# Patient Record
Sex: MALE | Race: BLACK OR AFRICAN AMERICAN | NOT HISPANIC OR LATINO | Employment: OTHER | URBAN - METROPOLITAN AREA
[De-identification: names, ages, dates, MRNs, and addresses within clinical notes are randomized per-mention and may not be internally consistent; named-entity substitution may affect disease eponyms.]

---

## 2018-07-19 ENCOUNTER — OFFICE VISIT (OUTPATIENT)
Dept: URGENT CARE | Facility: CLINIC | Age: 43
End: 2018-07-19
Payer: COMMERCIAL

## 2018-07-19 VITALS
DIASTOLIC BLOOD PRESSURE: 89 MMHG | OXYGEN SATURATION: 97 % | HEIGHT: 73 IN | BODY MASS INDEX: 32.2 KG/M2 | TEMPERATURE: 98 F | SYSTOLIC BLOOD PRESSURE: 122 MMHG | RESPIRATION RATE: 15 BRPM | WEIGHT: 243 LBS | HEART RATE: 78 BPM

## 2018-07-19 DIAGNOSIS — G89.29 CHRONIC RIGHT-SIDED LOW BACK PAIN WITHOUT SCIATICA: ICD-10-CM

## 2018-07-19 DIAGNOSIS — M54.50 CHRONIC RIGHT-SIDED LOW BACK PAIN WITHOUT SCIATICA: ICD-10-CM

## 2018-07-19 DIAGNOSIS — R21 RASH/SKIN ERUPTION: ICD-10-CM

## 2018-07-19 DIAGNOSIS — Z76.89 ENCOUNTER TO ESTABLISH CARE: ICD-10-CM

## 2018-07-19 DIAGNOSIS — S99.912A INJURY OF LEFT ANKLE, INITIAL ENCOUNTER: Primary | ICD-10-CM

## 2018-07-19 PROCEDURE — 99204 OFFICE O/P NEW MOD 45 MIN: CPT | Mod: S$GLB,,, | Performed by: NURSE PRACTITIONER

## 2018-07-19 PROCEDURE — 3074F SYST BP LT 130 MM HG: CPT | Mod: CPTII,S$GLB,, | Performed by: NURSE PRACTITIONER

## 2018-07-19 PROCEDURE — 3079F DIAST BP 80-89 MM HG: CPT | Mod: CPTII,S$GLB,, | Performed by: NURSE PRACTITIONER

## 2018-07-19 NOTE — PROGRESS NOTES
"Subjective:       Patient ID: Lawrence Devi is a 43 y.o. male.    Vitals:  height is 6' 1" (1.854 m) and weight is 110.2 kg (243 lb). His temperature is 97.8 °F (36.6 °C). His blood pressure is 122/89 and his pulse is 78. His respiration is 15 and oxygen saturation is 97%.     Chief Complaint: Ankle Pain (left ankle pain 1 week without known injury but is on feet alot) and Axillary Swelling (discomfort and itching intermittently 5 months from deotorant )    Pt is a  and local, reports left ankle pain for more than a week now. Says he walks a lot and is on his feet constantly for working movie sets. Also comes in for bilateral arm pit chaffing, irritation, and itching for 5 months now. Has been using cortisone and coconut oil.  Also says he has right flank pain and urinary difficulty and decreased output and struggling to urinate in the morning. Has not taken anything otc for signs or symptoms. Pt would like a work up with blood and urine to make sure all is well because he never sees a doctor and hasnt had a physical or labs done in a long time. Pt is also requesting referrals for pcp's.      Ankle Pain    The incident occurred more than 1 week ago. There was no injury mechanism. The pain is present in the right ankle. The quality of the pain is described as aching. The pain is at a severity of 6/10. The pain is moderate. The pain has been worsening since onset. Pertinent negatives include no loss of sensation, numbness or tingling. He reports no foreign bodies present.     Review of Systems   Constitution: Negative for chills, decreased appetite, malaise/fatigue and night sweats.   HENT: Negative for congestion, ear discharge, ear pain and sore throat.    Eyes: Negative for blurred vision and discharge.   Cardiovascular: Negative for chest pain.   Respiratory: Negative for cough, shortness of breath and wheezing.    Skin: Positive for dry skin, itching and rash.   Musculoskeletal: Positive for joint pain " and stiffness. Negative for back pain and joint swelling.   Gastrointestinal: Negative for bloating, abdominal pain, constipation, diarrhea, nausea and vomiting.   Genitourinary: Positive for flank pain and hesitancy. Negative for bladder incontinence, dysuria and urgency.   Neurological: Negative for dizziness, headaches, numbness and tingling.   Psychiatric/Behavioral: The patient is not nervous/anxious.        Objective:      Physical Exam   Constitutional: He is oriented to person, place, and time. Vital signs are normal. He appears well-developed and well-nourished. He is cooperative.  Non-toxic appearance. He does not have a sickly appearance. He does not appear ill. No distress.   HENT:   Head: Normocephalic and atraumatic.   Right Ear: Hearing, tympanic membrane, external ear and ear canal normal.   Left Ear: Hearing, tympanic membrane, external ear and ear canal normal.   Nose: Nose normal. No mucosal edema, rhinorrhea or nasal deformity. No epistaxis. Right sinus exhibits no maxillary sinus tenderness and no frontal sinus tenderness. Left sinus exhibits no maxillary sinus tenderness and no frontal sinus tenderness.   Mouth/Throat: Uvula is midline, oropharynx is clear and moist and mucous membranes are normal. No trismus in the jaw. Normal dentition. No uvula swelling. No posterior oropharyngeal erythema. Tonsils are 1+ on the right. Tonsils are 1+ on the left. No tonsillar exudate.   Eyes: Conjunctivae, EOM and lids are normal. Pupils are equal, round, and reactive to light. Right eye exhibits no discharge. Left eye exhibits no discharge. No scleral icterus.   Sclera clear bilat   Neck: Trachea normal, normal range of motion, full passive range of motion without pain and phonation normal. Neck supple.   Cardiovascular: Normal rate, regular rhythm, S1 normal, S2 normal, normal heart sounds, intact distal pulses and normal pulses.    Pulmonary/Chest: Effort normal and breath sounds normal. No respiratory  "distress. He has no decreased breath sounds. He has no wheezes. He has no rhonchi. He has no rales.   Abdominal: Soft. Normal appearance and bowel sounds are normal. He exhibits no distension, no pulsatile midline mass and no mass. There is no tenderness.   Musculoskeletal: He exhibits no edema or deformity.        Left ankle: He exhibits decreased range of motion. He exhibits no swelling, no ecchymosis, no deformity, no laceration and normal pulse. Tenderness. Lateral malleolus tenderness found. No medial malleolus, no AITFL, no CF ligament, no posterior TFL, no head of 5th metatarsal and no proximal fibula tenderness found. Achilles tendon normal.        Lumbar back: He exhibits decreased range of motion, tenderness and pain. He exhibits no bony tenderness, no swelling, no edema, no deformity, no laceration, no spasm and normal pulse.        Back:         Feet:    Decreased ROM secondary to pain.     Neurological: He is alert and oriented to person, place, and time. He exhibits normal muscle tone. Coordination normal.   Skin: Skin is warm, dry and intact. No lesion and no rash noted. He is not diaphoretic. No erythema. No pallor.   Axillary area without redness, swelling or rash.   There is mild tenderness to both axillary regions.  Patient reports "its highly irritated"   Psychiatric: He has a normal mood and affect. His speech is normal and behavior is normal. Judgment and thought content normal. Cognition and memory are normal.   Nursing note and vitals reviewed.                            Assessment:       1. Injury of left ankle, initial encounter    2. Chronic right-sided low back pain without sciatica    3. Rash/skin eruption    4. Encounter to establish care        Plan:       43 year old AAM who is a  presents to the clinic today with left ankle pain.   -  Ace bandage applied in urgent care.  Patient declined crutches at this time stating he has a cane at home.  Reports he does not have a " primary care doctor and would like to get set up with one today.    -  Appointment created for IM on today.  Patient states he would most likely cancel as he needed to get back to work.  Patient given the number to call the department directly in case he needed to change the appointment time.  Patient asked about pain medicine for his back and ankle.  Discussed using Tylenol as patient has an allergy to aspirin.  Patient verbalized understanding and agrees with plan of care.  Ambulates without difficulty and exits the urgent care.     Injury of left ankle, initial encounter  -     X-Ray Ankle Complete Left; Future; Expected date: 07/19/2018    Chronic right-sided low back pain without sciatica  -     Ambulatory referral to Internal Medicine    Rash/skin eruption  -     Ambulatory referral to Dermatology    Encounter to establish care  -     Ambulatory referral to Internal Medicine

## 2018-07-19 NOTE — PATIENT INSTRUCTIONS
General Referral to [facility]    You were referred to [facility] to Establish Care and Dermatology for evaluation of your condition.    Please call [phone] to schedule your appointment.    Please return here or go to the Emergency Department for any concerns or worsening of condition.  Please follow up with your primary care doctor or specialist in the next 48-72hrs as needed.    If you  smoke, please stop smoking.                                 Orthopedic Follow up Discharge Instructions    If your condition worsens or fails to improve we recommend that you receive another evaluation at the ER immediately or contact your PCP to discuss your concerns or return here. You must understand that you've received an urgent care treatment only and that you may be released before all your medical problems are known or treated. You the patient will arrange for follouwp care as instructed.   If you were prescribed a narcotic or muscle relaxant do not drive or operate heavy machinery while taking these medications   Tylenol or ibuprofen can also be used as directed for pain unless you have an allergy to them or medical condition such as stomach ulcers, kidney or liver disease or blood thinners etc for which you should not be taking these type of medications.   If you were given a prescription NSAID here do not also take any over the counter NSAID like ibuprofen, aleve, advil, motrin etc   RICE which means rest, ice compression and elevation are helpful.   If you have Low Back Pain and develop bowel or bladder symptoms or increase pain going down your legs go to the ER immediately.   If you were given a splint wear it at all times.   If you were given crutches use them as we instructed. Do not rest your armpits on the foam pad or you risk compressing the nerves and the vessels there   Follow up with the orthopedist in 1 week if you continue with pain.   Sometimes it can take up to 1 week for stress fractures  to show up on an X-ray, and may need reimaging or a CT or MRI of the affected area.

## 2018-07-22 ENCOUNTER — TELEPHONE (OUTPATIENT)
Dept: URGENT CARE | Facility: CLINIC | Age: 43
End: 2018-07-22

## 2018-10-01 ENCOUNTER — TELEPHONE (OUTPATIENT)
Dept: ENDOSCOPY | Facility: HOSPITAL | Age: 43
End: 2018-10-01

## 2018-10-01 DIAGNOSIS — Z12.11 SPECIAL SCREENING FOR MALIGNANT NEOPLASMS, COLON: Primary | ICD-10-CM

## 2018-10-01 RX ORDER — POLYETHYLENE GLYCOL 3350, SODIUM SULFATE ANHYDROUS, SODIUM BICARBONATE, SODIUM CHLORIDE, POTASSIUM CHLORIDE 236; 22.74; 6.74; 5.86; 2.97 G/4L; G/4L; G/4L; G/4L; G/4L
4 POWDER, FOR SOLUTION ORAL ONCE
Qty: 4000 ML | Refills: 0 | Status: SHIPPED | OUTPATIENT
Start: 2018-10-01 | End: 2018-10-01

## 2018-10-03 ENCOUNTER — ANESTHESIA EVENT (OUTPATIENT)
Dept: ENDOSCOPY | Facility: HOSPITAL | Age: 43
End: 2018-10-03
Payer: COMMERCIAL

## 2018-10-03 ENCOUNTER — ANESTHESIA (OUTPATIENT)
Dept: ENDOSCOPY | Facility: HOSPITAL | Age: 43
End: 2018-10-03
Payer: COMMERCIAL

## 2018-10-03 ENCOUNTER — HOSPITAL ENCOUNTER (OUTPATIENT)
Facility: HOSPITAL | Age: 43
Discharge: HOME | End: 2018-10-03
Attending: COLON & RECTAL SURGERY | Admitting: COLON & RECTAL SURGERY
Payer: COMMERCIAL

## 2018-10-03 VITALS
SYSTOLIC BLOOD PRESSURE: 133 MMHG | HEART RATE: 69 BPM | DIASTOLIC BLOOD PRESSURE: 91 MMHG | WEIGHT: 250 LBS | HEIGHT: 73 IN | OXYGEN SATURATION: 98 % | BODY MASS INDEX: 33.13 KG/M2 | TEMPERATURE: 98 F | RESPIRATION RATE: 18 BRPM

## 2018-10-03 DIAGNOSIS — R10.9 ABDOMINAL PAIN, UNSPECIFIED ABDOMINAL LOCATION: ICD-10-CM

## 2018-10-03 DIAGNOSIS — Z12.11 COLON CANCER SCREENING: Primary | ICD-10-CM

## 2018-10-03 PROCEDURE — E9220 PRA ENDO ANESTHESIA: HCPCS | Mod: 33,,, | Performed by: NURSE ANESTHETIST, CERTIFIED REGISTERED

## 2018-10-03 PROCEDURE — 37000009 HC ANESTHESIA EA ADD 15 MINS: Performed by: COLON & RECTAL SURGERY

## 2018-10-03 PROCEDURE — 88305 TISSUE EXAM BY PATHOLOGIST: CPT | Performed by: PATHOLOGY

## 2018-10-03 PROCEDURE — 45385 COLONOSCOPY W/LESION REMOVAL: CPT | Performed by: COLON & RECTAL SURGERY

## 2018-10-03 PROCEDURE — 25000003 PHARM REV CODE 250: Performed by: COLON & RECTAL SURGERY

## 2018-10-03 PROCEDURE — 63600175 PHARM REV CODE 636 W HCPCS: Performed by: NURSE ANESTHETIST, CERTIFIED REGISTERED

## 2018-10-03 PROCEDURE — 37000008 HC ANESTHESIA 1ST 15 MINUTES: Performed by: COLON & RECTAL SURGERY

## 2018-10-03 PROCEDURE — 45385 COLONOSCOPY W/LESION REMOVAL: CPT | Mod: 33,,, | Performed by: COLON & RECTAL SURGERY

## 2018-10-03 PROCEDURE — 88305 TISSUE EXAM BY PATHOLOGIST: CPT | Mod: 26,,, | Performed by: PATHOLOGY

## 2018-10-03 PROCEDURE — 27201089 HC SNARE, DISP (ANY): Performed by: COLON & RECTAL SURGERY

## 2018-10-03 PROCEDURE — 27201042 HC RETRIEVAL NET: Performed by: COLON & RECTAL SURGERY

## 2018-10-03 RX ORDER — PROPOFOL 10 MG/ML
VIAL (ML) INTRAVENOUS
Status: DISCONTINUED | OUTPATIENT
Start: 2018-10-03 | End: 2018-10-03

## 2018-10-03 RX ORDER — PROPOFOL 10 MG/ML
VIAL (ML) INTRAVENOUS CONTINUOUS PRN
Status: DISCONTINUED | OUTPATIENT
Start: 2018-10-03 | End: 2018-10-03

## 2018-10-03 RX ORDER — SODIUM CHLORIDE 9 MG/ML
INJECTION, SOLUTION INTRAVENOUS CONTINUOUS
Status: DISCONTINUED | OUTPATIENT
Start: 2018-10-03 | End: 2018-10-03 | Stop reason: HOSPADM

## 2018-10-03 RX ORDER — SODIUM CHLORIDE 0.9 % (FLUSH) 0.9 %
3 SYRINGE (ML) INJECTION
Status: DISCONTINUED | OUTPATIENT
Start: 2018-10-03 | End: 2018-10-03 | Stop reason: HOSPADM

## 2018-10-03 RX ORDER — LIDOCAINE HCL/PF 100 MG/5ML
SYRINGE (ML) INTRAVENOUS
Status: DISCONTINUED | OUTPATIENT
Start: 2018-10-03 | End: 2018-10-03

## 2018-10-03 RX ADMIN — LIDOCAINE HYDROCHLORIDE 50 MG: 20 INJECTION, SOLUTION INTRAVENOUS at 10:10

## 2018-10-03 RX ADMIN — PROPOFOL 175 MCG/KG/MIN: 10 INJECTION, EMULSION INTRAVENOUS at 10:10

## 2018-10-03 RX ADMIN — SODIUM CHLORIDE: 0.9 INJECTION, SOLUTION INTRAVENOUS at 10:10

## 2018-10-03 RX ADMIN — PROPOFOL 100 MG: 10 INJECTION, EMULSION INTRAVENOUS at 10:10

## 2018-10-03 NOTE — ANESTHESIA PREPROCEDURE EVALUATION
10/03/2018  Lawrence Devi is a 43 y.o., male.    Anesthesia Evaluation    I have reviewed the Patient Summary Reports.     I have reviewed the Medications.     Review of Systems  Hematology/Oncology:  Hematology Normal   Oncology Normal     EENT/Dental:EENT/Dental Normal   Cardiovascular:   Hypertension    Pulmonary:   COPD: emphysema.    Renal/:  Renal/ Normal     Hepatic/GI:  Hepatic/GI Normal    Musculoskeletal:  Musculoskeletal Normal    Neurological:   Headaches (migraines)    Endocrine:  Endocrine Normal    Dermatological:  Skin Normal        Physical Exam  General:  Well nourished    Airway/Jaw/Neck:  Airway Findings: Mallampati: I       Eyes/Ears/Nose:  EYES/EARS/NOSE FINDINGS: Normal   Dental:  DENTAL FINDINGS: Normal   Chest/Lungs:  Chest/Lungs Clear    Heart/Vascular:  Heart Findings: Normal Heart murmur: negative Vascular Findings: Normal    Abdomen:  Abdomen Findings: Normal    Musculoskeletal:  Musculoskeletal Findings: Normal   Skin:  Skin Findings: Normal    Mental Status:  Mental Status Findings: Normal        Anesthesia Plan  Type of Anesthesia, risks & benefits discussed:  Anesthesia Type:  general  Patient's Preference: general  Intra-op Monitoring Plan: standard ASA monitors  Intra-op Monitoring Plan Comments:   Post Op Pain Control Plan:   Post Op Pain Control Plan Comments:   Induction:   IV  Beta Blocker:  Patient is not currently on a Beta-Blocker (No further documentation required).       Informed Consent: Patient understands risks and agrees with Anesthesia plan.  Questions answered. Anesthesia consent signed with patient.  ASA Score: 2     Day of Surgery Review of History & Physical:  There are no significant changes.          Ready For Surgery From Anesthesia Perspective.

## 2018-10-03 NOTE — TRANSFER OF CARE
"Anesthesia Transfer of Care Note    Patient: Lawrence Devi    Procedure(s) Performed: Procedure(s) (LRB):  COLONOSCOPY (N/A)    Patient location: PACU    Anesthesia Type: general    Transport from OR: Transported from OR on room air with adequate spontaneous ventilation    Post pain: adequate analgesia    Post assessment: no apparent anesthetic complications    Post vital signs: stable    Level of consciousness: awake and alert    Nausea/Vomiting: no nausea/vomiting    Complications: none    Transfer of care protocol was followed      Last vitals:   Visit Vitals  /80 (BP Location: Left arm, Patient Position: Sitting)   Pulse 79   Temp 36.5 °C (97.7 °F)   Resp 16   Ht 6' 1" (1.854 m)   Wt 113.4 kg (250 lb)   SpO2 97%   BMI 32.98 kg/m²     "
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
## 2018-10-03 NOTE — ANESTHESIA POSTPROCEDURE EVALUATION
"Anesthesia Post Evaluation    Patient: Lawrence Devi    Procedure(s) Performed: Procedure(s) (LRB):  COLONOSCOPY (N/A)    Final Anesthesia Type: general  Patient location during evaluation: PACU  Patient participation: Yes- Able to Participate  Level of consciousness: awake and alert and oriented  Pain management: adequate  Airway patency: patent  PONV status at discharge: No PONV  Anesthetic complications: no      Cardiovascular status: blood pressure returned to baseline and hemodynamically stable  Respiratory status: unassisted  Hydration status: euvolemic  Follow-up not needed.        Visit Vitals  BP (!) 133/91   Pulse 69   Temp 36.5 °C (97.7 °F)   Resp 18   Ht 6' 1" (1.854 m)   Wt 113.4 kg (250 lb)   SpO2 98%   BMI 32.98 kg/m²       Pain/Dominga Score: Pain Assessment Performed: Yes (10/3/2018 11:27 AM)  Presence of Pain: denies (10/3/2018 11:27 AM)  Dominga Score: 10 (10/3/2018 11:27 AM)        "

## 2018-10-08 ENCOUNTER — HOSPITAL ENCOUNTER (OUTPATIENT)
Facility: HOSPITAL | Age: 43
Discharge: HOME | End: 2018-10-09
Attending: EMERGENCY MEDICINE | Admitting: COLON & RECTAL SURGERY
Payer: COMMERCIAL

## 2018-10-08 ENCOUNTER — TELEPHONE (OUTPATIENT)
Dept: ENDOSCOPY | Facility: HOSPITAL | Age: 43
End: 2018-10-08

## 2018-10-08 DIAGNOSIS — K62.5 RECTAL BLEEDING: Primary | ICD-10-CM

## 2018-10-08 PROBLEM — Z12.11 COLON CANCER SCREENING: Status: RESOLVED | Noted: 2018-10-03 | Resolved: 2018-10-08

## 2018-10-08 LAB
ABO + RH BLD: NORMAL
ALBUMIN SERPL BCP-MCNC: 3.8 G/DL
ALP SERPL-CCNC: 102 U/L
ALT SERPL W/O P-5'-P-CCNC: 43 U/L
ANION GAP SERPL CALC-SCNC: 7 MMOL/L
AST SERPL-CCNC: 23 U/L
BASOPHILS # BLD AUTO: 0.08 K/UL
BASOPHILS NFR BLD: 0.9 %
BILIRUB SERPL-MCNC: 0.5 MG/DL
BLD GP AB SCN CELLS X3 SERPL QL: NORMAL
BUN SERPL-MCNC: 13 MG/DL
CALCIUM SERPL-MCNC: 9.2 MG/DL
CHLORIDE SERPL-SCNC: 104 MMOL/L
CO2 SERPL-SCNC: 28 MMOL/L
CREAT SERPL-MCNC: 0.9 MG/DL
DIFFERENTIAL METHOD: ABNORMAL
EOSINOPHIL # BLD AUTO: 0.2 K/UL
EOSINOPHIL NFR BLD: 2.7 %
ERYTHROCYTE [DISTWIDTH] IN BLOOD BY AUTOMATED COUNT: 13.8 %
EST. GFR  (AFRICAN AMERICAN): >60 ML/MIN/1.73 M^2
EST. GFR  (NON AFRICAN AMERICAN): >60 ML/MIN/1.73 M^2
GLUCOSE SERPL-MCNC: 103 MG/DL
HCT VFR BLD AUTO: 32.9 %
HCT VFR BLD AUTO: 36.8 %
HGB BLD-MCNC: 10.6 G/DL
HGB BLD-MCNC: 12.3 G/DL
IMM GRANULOCYTES # BLD AUTO: 0.03 K/UL
IMM GRANULOCYTES NFR BLD AUTO: 0.4 %
INR PPP: 0.9
LIPASE SERPL-CCNC: 21 U/L
LYMPHOCYTES # BLD AUTO: 3.7 K/UL
LYMPHOCYTES NFR BLD: 42.8 %
MCH RBC QN AUTO: 30.1 PG
MCHC RBC AUTO-ENTMCNC: 33.4 G/DL
MCV RBC AUTO: 90 FL
MONOCYTES # BLD AUTO: 0.7 K/UL
MONOCYTES NFR BLD: 8.7 %
NEUTROPHILS # BLD AUTO: 3.8 K/UL
NEUTROPHILS NFR BLD: 44.5 %
NRBC BLD-RTO: 0 /100 WBC
PLATELET # BLD AUTO: 318 K/UL
PMV BLD AUTO: 11.1 FL
POTASSIUM SERPL-SCNC: 3.5 MMOL/L
PROT SERPL-MCNC: 6.9 G/DL
PROTHROMBIN TIME: 9.8 SEC
RBC # BLD AUTO: 4.08 M/UL
SODIUM SERPL-SCNC: 139 MMOL/L
WBC # BLD AUTO: 8.55 K/UL

## 2018-10-08 PROCEDURE — A4216 STERILE WATER/SALINE, 10 ML: HCPCS | Performed by: NURSE PRACTITIONER

## 2018-10-08 PROCEDURE — 63600175 PHARM REV CODE 636 W HCPCS: Performed by: PHYSICIAN ASSISTANT

## 2018-10-08 PROCEDURE — 25000003 PHARM REV CODE 250: Performed by: PHYSICIAN ASSISTANT

## 2018-10-08 PROCEDURE — 83690 ASSAY OF LIPASE: CPT

## 2018-10-08 PROCEDURE — 86850 RBC ANTIBODY SCREEN: CPT

## 2018-10-08 PROCEDURE — 25000003 PHARM REV CODE 250: Performed by: NURSE PRACTITIONER

## 2018-10-08 PROCEDURE — 80053 COMPREHEN METABOLIC PANEL: CPT

## 2018-10-08 PROCEDURE — 96374 THER/PROPH/DIAG INJ IV PUSH: CPT

## 2018-10-08 PROCEDURE — G0378 HOSPITAL OBSERVATION PER HR: HCPCS

## 2018-10-08 PROCEDURE — 93010 ELECTROCARDIOGRAM REPORT: CPT | Mod: ,,, | Performed by: INTERNAL MEDICINE

## 2018-10-08 PROCEDURE — 85014 HEMATOCRIT: CPT

## 2018-10-08 PROCEDURE — C9113 INJ PANTOPRAZOLE SODIUM, VIA: HCPCS | Performed by: PHYSICIAN ASSISTANT

## 2018-10-08 PROCEDURE — 99284 EMERGENCY DEPT VISIT MOD MDM: CPT | Mod: ,,, | Performed by: EMERGENCY MEDICINE

## 2018-10-08 PROCEDURE — 96361 HYDRATE IV INFUSION ADD-ON: CPT

## 2018-10-08 PROCEDURE — 85018 HEMOGLOBIN: CPT

## 2018-10-08 PROCEDURE — 99285 EMERGENCY DEPT VISIT HI MDM: CPT | Mod: 25

## 2018-10-08 PROCEDURE — 99219 PR INITIAL OBSERVATION CARE,LEVL II: CPT | Mod: ,,, | Performed by: NURSE PRACTITIONER

## 2018-10-08 PROCEDURE — 85025 COMPLETE CBC W/AUTO DIFF WBC: CPT

## 2018-10-08 PROCEDURE — 96375 TX/PRO/DX INJ NEW DRUG ADDON: CPT

## 2018-10-08 PROCEDURE — 36415 COLL VENOUS BLD VENIPUNCTURE: CPT

## 2018-10-08 PROCEDURE — 85610 PROTHROMBIN TIME: CPT

## 2018-10-08 RX ORDER — SODIUM CHLORIDE 0.9 % (FLUSH) 0.9 %
3 SYRINGE (ML) INJECTION EVERY 8 HOURS
Status: DISCONTINUED | OUTPATIENT
Start: 2018-10-08 | End: 2018-10-09 | Stop reason: HOSPADM

## 2018-10-08 RX ORDER — PANTOPRAZOLE SODIUM 40 MG/10ML
80 INJECTION, POWDER, LYOPHILIZED, FOR SOLUTION INTRAVENOUS
Status: COMPLETED | OUTPATIENT
Start: 2018-10-08 | End: 2018-10-08

## 2018-10-08 RX ORDER — ONDANSETRON 2 MG/ML
4 INJECTION INTRAMUSCULAR; INTRAVENOUS EVERY 8 HOURS PRN
Status: DISCONTINUED | OUTPATIENT
Start: 2018-10-08 | End: 2018-10-09 | Stop reason: HOSPADM

## 2018-10-08 RX ORDER — NALOXONE HCL 0.4 MG/ML
0.02 VIAL (ML) INJECTION
Status: DISCONTINUED | OUTPATIENT
Start: 2018-10-08 | End: 2018-10-09 | Stop reason: HOSPADM

## 2018-10-08 RX ORDER — SODIUM CHLORIDE 9 MG/ML
INJECTION, SOLUTION INTRAVENOUS CONTINUOUS
Status: DISCONTINUED | OUTPATIENT
Start: 2018-10-08 | End: 2018-10-09 | Stop reason: HOSPADM

## 2018-10-08 RX ADMIN — PANTOPRAZOLE SODIUM 80 MG: 40 INJECTION, POWDER, FOR SOLUTION INTRAVENOUS at 11:10

## 2018-10-08 RX ADMIN — SODIUM CHLORIDE: 0.9 INJECTION, SOLUTION INTRAVENOUS at 11:10

## 2018-10-08 RX ADMIN — SODIUM CHLORIDE 1000 ML: 0.9 INJECTION, SOLUTION INTRAVENOUS at 11:10

## 2018-10-08 RX ADMIN — SODIUM CHLORIDE: 0.9 INJECTION, SOLUTION INTRAVENOUS at 02:10

## 2018-10-08 RX ADMIN — Medication 3 ML: at 10:10

## 2018-10-08 RX ADMIN — Medication 3 ML: at 02:10

## 2018-10-08 NOTE — HPI
43 year old black male presents to ER with complaints of painless rectal bleeding, had colonoscopy 10/3/18 by Dr. Ibarra for lower back pain.  He began to see bright red blood with clots on Friday, continued with increase on Saturday with slight decrease on Sunday, denies abd or rectal pain, no dizzness, says he feels fine, denies any use of NSAIDS or ASA.    Colonoscopy 10/3/18:   One 8 mm polyp at the ileocecal valve, removed                         with a hot snare. Resected and retrieved.                        - One 15 mm polyp at the hepatic flexure, removed                         with a hot snare. Resected and retrieved.                        - One 6 mm polyp in the rectum, removed with a cold                         snare. Resected and retrieved.                        - The examination was otherwise normal.  Path not available at this time
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
## 2018-10-08 NOTE — PLAN OF CARE
Problem: Patient Care Overview  Goal: Plan of Care Review  Outcome: Ongoing (interventions implemented as appropriate)  Pt's VSS, NAD noted. Spouse at bedside. Pt aware awaiting another blood draw to monitor H/H at 1800. Pt aware of NPO status and to call nurse if he has rectal bleeding. Bed locked in lowest position, side rails upx2, call bell within reach, nonskid socks on pt. Pt refused teds/scds. Pt ambulates around room independently with even gait.
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
## 2018-10-08 NOTE — TELEPHONE ENCOUNTER
Patient contacted me for advice.  He stated that he has been having diarrhea with blood and wants to know what to do.  Instructed him to go to the Emergency Department for evaluation.  Stated understanding and said he would go now.

## 2018-10-08 NOTE — ED NOTES
LOC: The patient is awake, alert and aware of environment with an appropriate affect, the patient is oriented x 3 and speaking appropriately.  APPEARANCE: Patient resting comfortably and in no acute distress, patient is clean and well groomed, patient's clothing is properly fastened.  SKIN: The skin is warm and dry, color consistent with ethnicity, patient has normal skin turgor and moist mucus membranes, skin intact, no breakdown or bruising noted.  MUSCULOSKELETAL: Patient moving all extremities spontaneously, no obvious swelling or deformities noted.  RESPIRATORY: Airway is open and patent, respirations are spontaneous, patient has a normal effort and rate, no accessory muscle use noted.  ABDOMEN: Soft and non tender to palpation, no distention noted  NEUROLOGIC:  facial expression is symmetrical, patient moving all extremities spontaneously, normal sensation in all extremities when touched with a finger.  Follows all commands appropriately.

## 2018-10-08 NOTE — ED NOTES
Patient is resting well on stretcher, able to voice all needs, aaox4, informed of status of admit and room number, no complains at this time, will continue to monitor.

## 2018-10-09 VITALS
HEIGHT: 73 IN | BODY MASS INDEX: 33.13 KG/M2 | OXYGEN SATURATION: 98 % | DIASTOLIC BLOOD PRESSURE: 89 MMHG | WEIGHT: 250 LBS | HEART RATE: 62 BPM | TEMPERATURE: 97 F | SYSTOLIC BLOOD PRESSURE: 133 MMHG | RESPIRATION RATE: 18 BRPM

## 2018-10-09 PROBLEM — K62.5 RECTAL BLEEDING: Status: RESOLVED | Noted: 2018-10-08 | Resolved: 2018-10-09

## 2018-10-09 LAB
HCT VFR BLD AUTO: 32.1 %
HCT VFR BLD AUTO: 32.6 %
HCT VFR BLD AUTO: 35 %
HGB BLD-MCNC: 10.2 G/DL
HGB BLD-MCNC: 10.9 G/DL
HGB BLD-MCNC: 11.7 G/DL

## 2018-10-09 PROCEDURE — 36415 COLL VENOUS BLD VENIPUNCTURE: CPT

## 2018-10-09 PROCEDURE — 99234 HOSP IP/OBS SM DT SF/LOW 45: CPT | Mod: ,,, | Performed by: NURSE PRACTITIONER

## 2018-10-09 PROCEDURE — A4216 STERILE WATER/SALINE, 10 ML: HCPCS | Performed by: NURSE PRACTITIONER

## 2018-10-09 PROCEDURE — 85014 HEMATOCRIT: CPT | Mod: 91

## 2018-10-09 PROCEDURE — 25000003 PHARM REV CODE 250: Performed by: NURSE PRACTITIONER

## 2018-10-09 PROCEDURE — 85014 HEMATOCRIT: CPT

## 2018-10-09 PROCEDURE — 85018 HEMOGLOBIN: CPT | Mod: 91

## 2018-10-09 PROCEDURE — G0378 HOSPITAL OBSERVATION PER HR: HCPCS

## 2018-10-09 PROCEDURE — 85018 HEMOGLOBIN: CPT

## 2018-10-09 RX ADMIN — Medication 3 ML: at 05:10

## 2018-10-09 NOTE — PLAN OF CARE
Problem: Patient Care Overview  Goal: Plan of Care Review  Outcome: Ongoing (interventions implemented as appropriate)  Patient alert and oriented. Patient sitting up in chair at bedside. Patient denies any complaint of pain or discomfort at this time.

## 2018-10-09 NOTE — PLAN OF CARE
Problem: Patient Care Overview  Goal: Plan of Care Review  Outcome: Ongoing (interventions implemented as appropriate)  POC reviewed with patient and family.  No acute events overnight.  Pt had no BM or rectal bleeding.  OOB independently.  Currently NPO.  VSS.  Safety maintained.

## 2018-10-09 NOTE — PLAN OF CARE
Patient discharged to home. Discharge instructions given. Prescriptions given. Vital signs within normal limits. No distress noted

## 2018-10-09 NOTE — HOSPITAL COURSE
Pt was admited as obs for post polypectomy bleed, keep npo and serial H&H.  HD 2 he had not further bowel movements, H&H stable, jim diet and was discharged home to fu with Dr. Ibarra 2 weeks

## 2018-10-10 ENCOUNTER — TELEPHONE (OUTPATIENT)
Dept: ENDOSCOPY | Facility: HOSPITAL | Age: 43
End: 2018-10-10

## 2020-03-24 ENCOUNTER — OFFICE VISIT (OUTPATIENT)
Dept: URGENT CARE | Facility: CLINIC | Age: 45
End: 2020-03-24
Payer: COMMERCIAL

## 2020-03-24 VITALS
HEIGHT: 73 IN | OXYGEN SATURATION: 98 % | SYSTOLIC BLOOD PRESSURE: 146 MMHG | RESPIRATION RATE: 18 BRPM | BODY MASS INDEX: 33.13 KG/M2 | TEMPERATURE: 98 F | WEIGHT: 250 LBS | HEART RATE: 65 BPM | DIASTOLIC BLOOD PRESSURE: 96 MMHG

## 2020-03-24 DIAGNOSIS — I10 HYPERTENSION, UNSPECIFIED TYPE: Primary | ICD-10-CM

## 2020-03-24 DIAGNOSIS — Z00.00 ENCOUNTER FOR MEDICAL EXAMINATION TO ESTABLISH CARE: ICD-10-CM

## 2020-03-24 PROCEDURE — 99214 OFFICE O/P EST MOD 30 MIN: CPT | Mod: S$GLB,,, | Performed by: NURSE PRACTITIONER

## 2020-03-24 PROCEDURE — 99214 PR OFFICE/OUTPT VISIT, EST, LEVL IV, 30-39 MIN: ICD-10-PCS | Mod: S$GLB,,, | Performed by: NURSE PRACTITIONER

## 2020-03-24 RX ORDER — AMLODIPINE BESYLATE 5 MG/1
5 TABLET ORAL DAILY
Qty: 90 TABLET | Refills: 0 | Status: SHIPPED | OUTPATIENT
Start: 2020-03-24 | End: 2020-06-22

## 2020-03-24 NOTE — PROGRESS NOTES
"Subjective:       Patient ID: Lawrence Devi is a 44 y.o. male.    Vitals:  height is 6' 1" (1.854 m) and weight is 113.4 kg (250 lb). His tympanic temperature is 98.2 °F (36.8 °C). His blood pressure is 146/96 (abnormal) and his pulse is 65. His respiration is 18 and oxygen saturation is 98%.     Chief Complaint: Hypertension    Patient presents with c/o having elavated BP and headaches for the past two weeks. Pt showed BP log with readings ranging from 140s/90s-100s. Pt has prior HTN dx and was taking BP medicine at one point but stopped f/u.     Hypertension   This is a new problem. The current episode started in the past 7 days. The problem has been gradually worsening since onset. The problem is uncontrolled. Associated symptoms include headaches. Pertinent negatives include no blurred vision, chest pain, palpitations, peripheral edema or shortness of breath. There are no associated agents to hypertension. Risk factors for coronary artery disease include male gender. Treatments tried: can not recall prior HTN medication. The current treatment provides no improvement. There are no compliance problems.        Constitution: Negative for chills, fatigue and fever.   HENT: Negative for congestion and sore throat.    Neck: Negative for painful lymph nodes.   Cardiovascular: Negative for chest pain, leg swelling, palpitations and sob on exertion.   Eyes: Negative for vision loss, double vision and blurred vision.   Respiratory: Negative for cough and shortness of breath.    Gastrointestinal: Negative for nausea, vomiting and diarrhea.   Genitourinary: Negative for dysuria, frequency and urgency.   Musculoskeletal: Negative for joint pain, joint swelling and muscle ache.   Skin: Negative for color change, pale and rash.   Allergic/Immunologic: Negative for seasonal allergies.   Neurological: Positive for headaches and history of migraines. Negative for dizziness, light-headedness and passing out.   Hematologic/Lymphatic: " Negative for swollen lymph nodes, easy bruising/bleeding and history of blood clots. Does not bruise/bleed easily.   Psychiatric/Behavioral: Negative for nervous/anxious and depression. The patient is not nervous/anxious.        Objective:      Physical Exam   Constitutional: He is oriented to person, place, and time. He appears well-developed and well-nourished. He is cooperative.  Non-toxic appearance. He does not appear ill. No distress.   HENT:   Head: Normocephalic and atraumatic.   Right Ear: Hearing, tympanic membrane, external ear and ear canal normal.   Left Ear: Hearing, tympanic membrane, external ear and ear canal normal.   Nose: Nose normal. No mucosal edema, rhinorrhea or nasal deformity. No epistaxis. Right sinus exhibits no maxillary sinus tenderness and no frontal sinus tenderness. Left sinus exhibits no maxillary sinus tenderness and no frontal sinus tenderness.   Mouth/Throat: Uvula is midline, oropharynx is clear and moist and mucous membranes are normal. No trismus in the jaw. Normal dentition. No uvula swelling. No posterior oropharyngeal erythema.   Eyes: Conjunctivae and lids are normal. Right eye exhibits no discharge. Left eye exhibits no discharge. No scleral icterus.   Neck: Trachea normal, normal range of motion, full passive range of motion without pain and phonation normal. Neck supple.   Cardiovascular: Normal rate, regular rhythm, normal heart sounds, intact distal pulses and normal pulses.   Pulmonary/Chest: Effort normal and breath sounds normal. No respiratory distress.   Abdominal: Soft. Normal appearance and bowel sounds are normal. He exhibits no distension, no pulsatile midline mass and no mass. There is no tenderness.   Musculoskeletal: Normal range of motion. He exhibits no edema or deformity.   Neurological: He is alert and oriented to person, place, and time. He exhibits normal muscle tone. Coordination normal.   Skin: Skin is warm, dry, intact, not diaphoretic and not  pale.   Psychiatric: He has a normal mood and affect. His speech is normal and behavior is normal. Judgment and thought content normal. Cognition and memory are normal.   Nursing note and vitals reviewed.        Assessment:       1. Hypertension, unspecified type        Plan:         Hypertension, unspecified type    Other orders  -     amLODIPine (NORVASC) 5 MG tablet; Take 1 tablet (5 mg total) by mouth once daily. Take at bedtime  Dispense: 90 tablet; Refill: 0      Patient Instructions       Take your prescribed blood pressure medication every evening.   Record your blood pressures and heart rate daily and keep a written log to show your primary care provider.  If you experience any side effects call the clinic at [phone].     You must understand that you've received an Urgent Care treatment only and that you may be released before all your medical problems are known or treated. You, the patient, will arrange for follow up care as instructed.  If your condition worsens we recommend that you receive another evaluation at the emergency room immediately or contact your primary medical clinics after hours call service to discuss your concerns.  Please return here or go to the Emergency Department for any concerns or worsening of condition.    Eating Heart-Healthy Foods  Eating has a big impact on your heart health. In fact, eating healthier can improve several of your heart risks at once. For instance, it helps you manage weight, cholesterol, and blood pressure. Here are ideas to help you make heart-healthy changes without giving up all the foods and flavors you love.  Getting started  · Talk with your health care provider about eating plans, such as the DASH or Mediterranean diet. You may also be referred to a dietitian.  · Change a few things at a time. Give yourself time to get used to a few eating changes before adding more.  · Work to create a tasty, healthy eating plan that you can stick to for the rest  of your life.    Goals for healthy eating  Below are some tips to improve your eating habits:  · Limit saturated fats and trans fats. Saturated fats raise your levels of cholesterol, so keep these fats to a minimum. They are found in foods such as fatty meats, whole milk, cheese, and palm and coconut oils. Avoid trans fats because they lower good cholesterol as well as raise bad cholesterol. Trans fats are most often found in processed foods.  · Reduce sodium (salt) intake. Eating too much salt may increase your blood pressure. Limit your sodium intake to 2,300 milligrams (mg) per day, or less if your health care provider recommends it. Dining out less often and eating fewer processed foods are two great ways to decrease the amount of salt you consume.  · Managing calories. A calorie is a unit of energy. Your body burns calories for fuel, but if you eat more calories than your body burns, the extras are stored as fat. Your health care provider can help you create a diet plan to manage your calories. This will likely include eating healthier foods as well as exercising regularly. To help you track your progress, keep a diary to record what you eat and how often you exercise.  Choose the right foods  Aim to make these foods staples of your diet. If you have diabetes, you may have different recommendations than what is listed here:  · Fruits and vegetable provide plenty of nutrients without a lot of calories. At meals, fill half your plate with these foods. Split the other half of your plate between whole grains and lean protein.  · Whole grains are high in fiber and rich in vitamins and nutrients. Good choices include whole-wheat bread, pasta, and brown rice.  · Lean proteins give you nutrition with less fat. Good choices include fish, skinless chicken, and beans.  · Low-fat or nonfat dairy provides nutrients without a lot of fat. Try low-fat or nonfat milk, cheese, or yogurt.  · Healthy fats can be good for you in  small amounts. These are unsaturated fats, such as olive oil, nuts, and fish. Try to have at least 2 servings per week of fatty fish such as salmon, sardines, mackerel, rainbow trout, and albacore tuna. These contain omega-3 fatty acids, which are good for your heart. Flaxseed is another source of a heart-healthy fat.  More on heart healthy eating    Read food labels  Healthy eating starts at the grocery store. Be sure to pay attention to food labels on packaged foods. Look for products that are high in fiber and protein, and low in saturated fat, cholesterol, and sodium. Avoid products that contain trans fat. And pay close attention to serving size. For instance, if you plan to eat two servings, double all the numbers on the label.  Prepare food right  A key part of healthy cooking is cutting down on added fat and salt. Look on the internet for lower-fat, lower-sodium recipes. Also, try these tips:  · Remove fat from meat and skin from poultry before cooking.  · Skim fat from the surface of soups and sauces.  · Broil, boil, bake, steam, grill, and microwave food without added fats.  · Choose ingredients that spice up your food without adding calories, fat, or sodium. Try these items: horseradish, hot sauce, lemon, mustard, nonfat salad dressings, and vinegar. For salt-free herbs and spices, try basil, cilantro, cinnamon, pepper, and rosemary.  Date Last Reviewed: 6/25/2015  © 7259-6922 [employer]. [address] [place] [address]. All rights reserved. This information is not intended as a substitute for professional medical care. Always follow your healthcare professional's instructions.        Exercise for a Healthier Heart  You may wonder how you can improve the health of your heart. If youre thinking about exercise, youre on the right track. You dont need to become an athlete, but you do need a certain amount of brisk exercise to help strengthen your heart. If you have been diagnosed  with a heart condition, your doctor may recommend exercise to help stabilize your condition. To help make exercise a habit, choose safe, fun activities.     Exercise with a friend. When activity is fun, you're more likely to stick with it.     Be sure to check with your healthcare provider before starting an exercise program.   Why exercise?  Exercising regularly offers many healthy rewards. It can help you do all of the following:  · Improve your blood cholesterol level to help prevent further heart trouble  · Lower your blood pressure to help prevent a stroke or heart attack  · Control diabetes, or reduce your risk of getting this disease  · Improve your heart and lung function  · Reach and maintain a healthy weight  · Make your muscles stronger and more limber so you can stay active  · Prevent falls and fractures by slowing the loss of bone mass (osteoporosis)  · Manage stress better  · Reduce your blood pressure  · Improve your sense of self and your body image  Exercise tips  Ease into your routine. Set small goals. Then build on them.  Exercise on most days. Aim for a total of 150 or more minutes of moderate to  vigorous intensity activity each week. Consider 40 minutes, 3 to 4 times a week. For best results, activity should last for 40 minutes on average. It is OK to work up to the 40 minute period over time. Examples of moderate-intensity activity is walking 1 mile in 15 minutes or 30 to 45 minutes of yard work.  Step up your daily activity level. Along with your exercise program, try being more active throughout the day. Walk instead of drive. Do more household tasks or yard work.  Choose one or more activities you enjoy. Walking is one of the easiest things you can do. You can also try swimming, riding a bike, dancing, or taking an exercise class.  Stop exercising and call your doctor if you:  · Have chest pain or feel dizzy or lightheaded  · Feel burning, tightness, pressure, or heaviness in your chest,  neck, shoulders, back, or arms  · Have unusual shortness of breath  · Have increased joint or muscle pain  · Have palpitations or an irregular heartbeat   Date Last Reviewed: 5/1/2016 © 2000-2017 [employer]. [address] [place] [address], [place] [address]. All rights reserved. This information is not intended as a substitute for professional medical care. Always follow your healthcare professional's instructions.        Controlling High Blood Pressure  High blood pressure (hypertension) is often called the silent killer. This is because many people who have it dont know it. High blood pressure is defined as 140/90 mm Hg or higher. Know your blood pressure and remember to check it regularly. Doing so can save your life. Here are some things you can do to help control your blood pressure.    Choose heart-healthy foods  · Select low-salt, low-fat foods. Limit sodium intake to 2,400 mg per day or the amount suggested by your healthcare provider.  · Limit canned, dried, cured, packaged, and fast foods. These can contain a lot of salt.  · Eat 8 to 10 servings of fruits and vegetables every day.  · Choose lean meats, fish, or chicken.  · Eat whole-grain pasta, brown rice, and beans.  · Eat 2 to 3 servings of low-fat or fat-free dairy products.  · Ask your doctor about the DASH eating plan. This plan helps reduce blood pressure.  · When you go to a restaurant, ask that your meal be prepared with no added salt.  Maintain a healthy weight  · Ask your healthcare provider how many calories to eat a day. Then stick to that number.  · Ask your healthcare provider what weight range is healthiest for you. If you are overweight, a weight loss of only 3% to 5% of your body weight can help lower blood pressure. Generally, a good weight loss goal is to lose 10% of your body weight in a year.  · Limit snacks and sweets.  · Get regular exercise.  Get up and get active  · Choose activities you enjoy. Find ones you can do with  friends or family. This includes bicycling, dancing, walking, and jogging.  · Park farther away from building entrances.  · Use stairs instead of the elevator.  · When you can, walk or bike instead of driving.  · Climax leaves, garden, or do household repairs.  · Be active at a moderate to vigorous level of physical activity for at least 40 minutes for a minimum of 3 to 4 days a week.   Manage stress  · Make time to relax and enjoy life. Find time to laugh.  · Communicate your concerns with your loved ones and your healthcare provider.  · Visit with family and friends, and keep up with hobbies.  Limit alcohol and quit smoking  · Men should have no more than 2 drinks per day.  · Women should have no more than 1 drink per day.  · Talk with your healthcare provider about quitting smoking. Smoking significantly increases your risk for heart disease and stroke. Ask your healthcare provider about community smoking cessation programs and other options.  Medicines  If lifestyle changes arent enough, your healthcare provider may prescribe high blood pressure medicine. Take all medicines as prescribed. If you have any questions about your medicines, ask your healthcare provider before stopping or changing them.   Date Last Reviewed: 4/27/2016  © 2273-4895 [employer]. [address] [place] [address], [place] [address]. All rights reserved. This information is not intended as a substitute for professional medical care. Always follow your healthcare professional's instructions.

## 2020-03-24 NOTE — PATIENT INSTRUCTIONS
Take your prescribed blood pressure medication every evening.   Record your blood pressures and heart rate daily and keep a written log to show your primary care provider.  If you experience any side effects call the clinic at [phone].     You must understand that you've received an Urgent Care treatment only and that you may be released before all your medical problems are known or treated. You, the patient, will arrange for follow up care as instructed.  If your condition worsens we recommend that you receive another evaluation at the emergency room immediately or contact your primary medical clinics after hours call service to discuss your concerns.  Please return here or go to the Emergency Department for any concerns or worsening of condition.    Eating Heart-Healthy Foods  Eating has a big impact on your heart health. In fact, eating healthier can improve several of your heart risks at once. For instance, it helps you manage weight, cholesterol, and blood pressure. Here are ideas to help you make heart-healthy changes without giving up all the foods and flavors you love.  Getting started  · Talk with your health care provider about eating plans, such as the DASH or Mediterranean diet. You may also be referred to a dietitian.  · Change a few things at a time. Give yourself time to get used to a few eating changes before adding more.  · Work to create a tasty, healthy eating plan that you can stick to for the rest of your life.    Goals for healthy eating  Below are some tips to improve your eating habits:  · Limit saturated fats and trans fats. Saturated fats raise your levels of cholesterol, so keep these fats to a minimum. They are found in foods such as fatty meats, whole milk, cheese, and palm and coconut oils. Avoid trans fats because they lower good cholesterol as well as raise bad cholesterol. Trans fats are most often found in processed foods.  · Reduce sodium (salt) intake. Eating too much salt  may increase your blood pressure. Limit your sodium intake to 2,300 milligrams (mg) per day, or less if your health care provider recommends it. Dining out less often and eating fewer processed foods are two great ways to decrease the amount of salt you consume.  · Managing calories. A calorie is a unit of energy. Your body burns calories for fuel, but if you eat more calories than your body burns, the extras are stored as fat. Your health care provider can help you create a diet plan to manage your calories. This will likely include eating healthier foods as well as exercising regularly. To help you track your progress, keep a diary to record what you eat and how often you exercise.  Choose the right foods  Aim to make these foods staples of your diet. If you have diabetes, you may have different recommendations than what is listed here:  · Fruits and vegetable provide plenty of nutrients without a lot of calories. At meals, fill half your plate with these foods. Split the other half of your plate between whole grains and lean protein.  · Whole grains are high in fiber and rich in vitamins and nutrients. Good choices include whole-wheat bread, pasta, and brown rice.  · Lean proteins give you nutrition with less fat. Good choices include fish, skinless chicken, and beans.  · Low-fat or nonfat dairy provides nutrients without a lot of fat. Try low-fat or nonfat milk, cheese, or yogurt.  · Healthy fats can be good for you in small amounts. These are unsaturated fats, such as olive oil, nuts, and fish. Try to have at least 2 servings per week of fatty fish such as salmon, sardines, mackerel, rainbow trout, and albacore tuna. These contain omega-3 fatty acids, which are good for your heart. Flaxseed is another source of a heart-healthy fat.  More on heart healthy eating    Read food labels  Healthy eating starts at the grocery store. Be sure to pay attention to food labels on packaged foods. Look for products that are  high in fiber and protein, and low in saturated fat, cholesterol, and sodium. Avoid products that contain trans fat. And pay close attention to serving size. For instance, if you plan to eat two servings, double all the numbers on the label.  Prepare food right  A key part of healthy cooking is cutting down on added fat and salt. Look on the internet for lower-fat, lower-sodium recipes. Also, try these tips:  · Remove fat from meat and skin from poultry before cooking.  · Skim fat from the surface of soups and sauces.  · Broil, boil, bake, steam, grill, and microwave food without added fats.  · Choose ingredients that spice up your food without adding calories, fat, or sodium. Try these items: horseradish, hot sauce, lemon, mustard, nonfat salad dressings, and vinegar. For salt-free herbs and spices, try basil, cilantro, cinnamon, pepper, and rosemary.  Date Last Reviewed: 6/25/2015 © 2000-2017 [employer]. [address] [place] [address]. All rights reserved. This information is not intended as a substitute for professional medical care. Always follow your healthcare professional's instructions.        Exercise for a Healthier Heart  You may wonder how you can improve the health of your heart. If youre thinking about exercise, youre on the right track. You dont need to become an athlete, but you do need a certain amount of brisk exercise to help strengthen your heart. If you have been diagnosed with a heart condition, your doctor may recommend exercise to help stabilize your condition. To help make exercise a habit, choose safe, fun activities.     Exercise with a friend. When activity is fun, you're more likely to stick with it.     Be sure to check with your healthcare provider before starting an exercise program.   Why exercise?  Exercising regularly offers many healthy rewards. It can help you do all of the following:  · Improve your blood cholesterol level to help prevent further  heart trouble  · Lower your blood pressure to help prevent a stroke or heart attack  · Control diabetes, or reduce your risk of getting this disease  · Improve your heart and lung function  · Reach and maintain a healthy weight  · Make your muscles stronger and more limber so you can stay active  · Prevent falls and fractures by slowing the loss of bone mass (osteoporosis)  · Manage stress better  · Reduce your blood pressure  · Improve your sense of self and your body image  Exercise tips  Ease into your routine. Set small goals. Then build on them.  Exercise on most days. Aim for a total of 150 or more minutes of moderate to  vigorous intensity activity each week. Consider 40 minutes, 3 to 4 times a week. For best results, activity should last for 40 minutes on average. It is OK to work up to the 40 minute period over time. Examples of moderate-intensity activity is walking 1 mile in 15 minutes or 30 to 45 minutes of yard work.  Step up your daily activity level. Along with your exercise program, try being more active throughout the day. Walk instead of drive. Do more household tasks or yard work.  Choose one or more activities you enjoy. Walking is one of the easiest things you can do. You can also try swimming, riding a bike, dancing, or taking an exercise class.  Stop exercising and call your doctor if you:  · Have chest pain or feel dizzy or lightheaded  · Feel burning, tightness, pressure, or heaviness in your chest, neck, shoulders, back, or arms  · Have unusual shortness of breath  · Have increased joint or muscle pain  · Have palpitations or an irregular heartbeat   Date Last Reviewed: 5/1/2016  © 9887-9737 [employer]. [address] [place] [address], [place] [address]. All rights reserved. This information is not intended as a substitute for professional medical care. Always follow your healthcare professional's instructions.        Controlling High Blood Pressure  High blood pressure (hypertension)  is often called the silent killer. This is because many people who have it dont know it. High blood pressure is defined as 140/90 mm Hg or higher. Know your blood pressure and remember to check it regularly. Doing so can save your life. Here are some things you can do to help control your blood pressure.    Choose heart-healthy foods  · Select low-salt, low-fat foods. Limit sodium intake to 2,400 mg per day or the amount suggested by your healthcare provider.  · Limit canned, dried, cured, packaged, and fast foods. These can contain a lot of salt.  · Eat 8 to 10 servings of fruits and vegetables every day.  · Choose lean meats, fish, or chicken.  · Eat whole-grain pasta, brown rice, and beans.  · Eat 2 to 3 servings of low-fat or fat-free dairy products.  · Ask your doctor about the DASH eating plan. This plan helps reduce blood pressure.  · When you go to a restaurant, ask that your meal be prepared with no added salt.  Maintain a healthy weight  · Ask your healthcare provider how many calories to eat a day. Then stick to that number.  · Ask your healthcare provider what weight range is healthiest for you. If you are overweight, a weight loss of only 3% to 5% of your body weight can help lower blood pressure. Generally, a good weight loss goal is to lose 10% of your body weight in a year.  · Limit snacks and sweets.  · Get regular exercise.  Get up and get active  · Choose activities you enjoy. Find ones you can do with friends or family. This includes bicycling, dancing, walking, and jogging.  · Park farther away from building entrances.  · Use stairs instead of the elevator.  · When you can, walk or bike instead of driving.  · [place] leaves, garden, or do household repairs.  · Be active at a moderate to vigorous level of physical activity for at least 40 minutes for a minimum of 3 to 4 days a week.   Manage stress  · Make time to relax and enjoy life. Find time to laugh.  · Communicate your concerns with your  loved ones and your healthcare provider.  · Visit with family and friends, and keep up with hobbies.  Limit alcohol and quit smoking  · Men should have no more than 2 drinks per day.  · Women should have no more than 1 drink per day.  · Talk with your healthcare provider about quitting smoking. Smoking significantly increases your risk for heart disease and stroke. Ask your healthcare provider about community smoking cessation programs and other options.  Medicines  If lifestyle changes arent enough, your healthcare provider may prescribe high blood pressure medicine. Take all medicines as prescribed. If you have any questions about your medicines, ask your healthcare provider before stopping or changing them.   Date Last Reviewed: 4/27/2016  © 0185-0103 [employer]. [address] [place] [address], [place] [address]. All rights reserved. This information is not intended as a substitute for professional medical care. Always follow your healthcare professional's instructions.

## 2020-03-26 ENCOUNTER — TELEPHONE (OUTPATIENT)
Dept: URGENT CARE | Facility: CLINIC | Age: 45
End: 2020-03-26

## 2020-03-26 NOTE — TELEPHONE ENCOUNTER
Patient calls in stating he was seen on Monday and was given amlodipine for his hypertension. Patient states that Keshav, Cam, told him to call back if amlodipine did not reduce his #s. Patient reports bp reading of 149/100. Explained to patient that we have a different provider so I would relay info and call him back. liz

## 2020-03-26 NOTE — TELEPHONE ENCOUNTER
I spoke with pt about his BP and explained that it would most likely take several days for the BP meds to work. I recommended he reduce his salt and try to get some exercise and get a better nights sleep (he admits he hasnt been sleeping well due to stress with the covid situation.I recommend he give the amlodipine 5 mg another few days to work and if his BP isnt better than he can try increasing to 2 tablets a day.  Then if not better try and follow up with his PCP.-he should call them and see if they can recheck him in a month. He agrees to do this. If he isnt able to reach them he can call back

## 2021-04-26 ENCOUNTER — PATIENT MESSAGE (OUTPATIENT)
Dept: RESEARCH | Facility: HOSPITAL | Age: 46
End: 2021-04-26

## 2023-01-30 ENCOUNTER — OFFICE VISIT (OUTPATIENT)
Dept: URGENT CARE | Facility: CLINIC | Age: 48
End: 2023-01-30
Payer: COMMERCIAL

## 2023-01-30 VITALS
OXYGEN SATURATION: 98 % | BODY MASS INDEX: 30.35 KG/M2 | WEIGHT: 229 LBS | DIASTOLIC BLOOD PRESSURE: 96 MMHG | HEIGHT: 73 IN | HEART RATE: 84 BPM | SYSTOLIC BLOOD PRESSURE: 142 MMHG | TEMPERATURE: 99 F

## 2023-01-30 DIAGNOSIS — M25.512 ACUTE PAIN OF LEFT SHOULDER: ICD-10-CM

## 2023-01-30 DIAGNOSIS — S46.912A STRAIN OF LEFT SHOULDER, INITIAL ENCOUNTER: Primary | ICD-10-CM

## 2023-01-30 DIAGNOSIS — Z02.6 ENCOUNTER RELATED TO WORKER'S COMPENSATION CLAIM: ICD-10-CM
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
## 2023-01-30 PROCEDURE — 73030 X-RAY EXAM OF SHOULDER: CPT | Mod: LT,S$GLB,, | Performed by: RADIOLOGY

## 2023-01-30 PROCEDURE — 73030 XR SHOULDER COMPLETE 2 OR MORE VIEWS LEFT: ICD-10-PCS | Mod: LT,S$GLB,, | Performed by: RADIOLOGY

## 2023-01-30 PROCEDURE — 99203 OFFICE O/P NEW LOW 30 MIN: CPT | Mod: S$GLB,,, | Performed by: PHYSICIAN ASSISTANT

## 2023-01-30 PROCEDURE — 99203 PR OFFICE/OUTPT VISIT, NEW, LEVL III, 30-44 MIN: ICD-10-PCS | Mod: S$GLB,,, | Performed by: PHYSICIAN ASSISTANT

## 2023-01-30 RX ORDER — IBUPROFEN 600 MG/1
600 TABLET ORAL 3 TIMES DAILY
Qty: 30 TABLET | Refills: 0 | Status: SHIPPED | OUTPATIENT
Start: 2023-01-30 | End: 2023-02-14 | Stop reason: ALTCHOICE

## 2023-01-30 NOTE — PROGRESS NOTES
"Subjective:       Patient ID: Lawrence Devi is a 47 y.o. male.    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  Says he slipped down causing pain to the left shoulder.  Patient did not fall to the ground.  Patient reports previous left shoulder injury, but describes as "nothing serious".  Denies any surgical interventions to the shoulder.  Patient also reports mild left-sided neck pain.  Denies radicular symptoms.  Says he took Tylenol and is using a Salonpas patch with little relief.  Reports laying on the shoulder makes worse. MEB    Shoulder Injury   The incident occurred at work. The left shoulder is affected. The incident occurred 6 to 12 hours ago. The injury mechanism was a direct blow. The quality of the pain is described as aching, burning, shooting and stabbing. The pain radiates to the left arm. The pain is at a severity of 6/10. The pain is moderate. Associated symptoms include numbness and tingling. Pertinent negatives include no chest pain. The symptoms are aggravated by movement. He has tried nothing for the symptoms. The treatment provided no relief.     Constitution: Positive for activity change.   HENT:  Negative for facial trauma.    Neck: Positive for neck pain.   Cardiovascular:  Negative for chest pain.   Eyes:  Negative for eye trauma.   Gastrointestinal:  Negative for abdominal trauma and abdominal pain.   Musculoskeletal:  Positive for pain, trauma and joint pain.   Skin:  Negative for bruising.   Neurological:  " Positive for numbness and tingling.   Psychiatric/Behavioral:  Negative for self-injury.       Objective:      Physical Exam  Vitals and nursing note reviewed.   Constitutional:       General: He is not in acute distress.     Appearance: He is well-developed. He is not diaphoretic.   HENT:      Head: Normocephalic and atraumatic.      Right Ear: Hearing and external ear normal.      Left Ear: Hearing and external ear normal.      Nose: Nose normal. No nasal deformity.   Eyes:      General: Lids are normal. No scleral icterus.     Conjunctiva/sclera: Conjunctivae normal.   Neck:      Trachea: Trachea normal.   Cardiovascular:      Pulses: Normal pulses.   Pulmonary:      Effort: Pulmonary effort is normal. No respiratory distress.      Breath sounds: No stridor.   Musculoskeletal:      Left shoulder: Tenderness (Lateral aspect) present. No swelling or deformity. Normal range of motion. Normal pulse.      Cervical back: Normal range of motion.      Comments: Left Shoulder FAROM, TTP lateral aspect and left paraspinal region of C-spine.   Percy, O'Price positive.    Skin:     General: Skin is warm and dry.      Capillary Refill: Capillary refill takes less than 2 seconds.   Neurological:      Mental Status: He is alert. He is not disoriented.      GCS: GCS eye subscore is 4. GCS verbal subscore is 5. GCS motor subscore is 6.      Sensory: No sensory deficit.   Psychiatric:         Attention and Perception: He is attentive.         Speech: Speech normal.         Behavior: Behavior normal.         X-ray left shoulder reveals no acute bony abnormalities including fractures or dislocations.  Pending radiology review.  Assessment:       1. Strain of left shoulder, initial encounter    2. Encounter related to worker's compensation claim    3. Acute pain of left shoulder    4. Work related injury          Plan:         Medications Ordered This Encounter   Medications    ibuprofen (ADVIL,MOTRIN) 600 MG tablet     Sig: Take 1  tablet (600 mg total) by mouth 3 (three) times daily. Take with meals.     Dispense:  30 tablet     Refill:  0     Patient Instructions: Daily home exercises/warm soaks   Restrictions: Limited use of left hand and arm, No lifting/pushing/pulling more than 10 lbs  Follow up in about 1 week (around 2/6/2023).         PAST MEDICAL HISTORY:  Carpal tunnel syndrome

## 2023-01-30 NOTE — LETTER
Urgent Care - [address] [address]  Phone: [phone]  Fax: [phone]  [facility]: [phone]    Pt Name: Lawrence Barrow Date: 01/28/2023   Employee ID: [id number] Date of First Treatment: 01/30/2023   Company: [employer]      Appointment Time: 09:35 AM Arrived: 9:35AM   Provider: Delbert Aviles PA-C Time Out:11:27AM     Office Treatment:   1. Strain of left shoulder, initial encounter    2. Encounter related to worker's compensation claim    3. Acute pain of left shoulder    4. Work related injury      Medications Ordered This Encounter   Medications    ibuprofen (ADVIL,MOTRIN) 600 MG tablet      Patient Instructions: Daily home exercises/warm soaks    Restrictions: Limited use of left hand and arm, No lifting/pushing/pulling more than 10 lbs     Return Appointment: 2/6/2023 at 9:30AM    KW

## 2023-02-09 ENCOUNTER — TELEPHONE (OUTPATIENT)
Dept: URGENT CARE | Facility: CLINIC | Age: 48
End: 2023-02-09
Payer: COMMERCIAL

## 2023-02-09 NOTE — TELEPHONE ENCOUNTER
Called patient and he verbalized the new [facility] health  Appt, date & time.  AFG

## 2023-02-13 ENCOUNTER — TELEPHONE (OUTPATIENT)
Dept: URGENT CARE | Facility: CLINIC | Age: 48
End: 2023-02-13
Payer: COMMERCIAL

## 2023-02-13 NOTE — TELEPHONE ENCOUNTER
Called patient and verbalized his new [facility] health  Appt, date & time for the third time.  CIRO

## 2023-02-14 ENCOUNTER — OFFICE VISIT (OUTPATIENT)
Dept: URGENT CARE | Facility: CLINIC | Age: 48
End: 2023-02-14
Payer: COMMERCIAL

## 2023-02-14 VITALS — DIASTOLIC BLOOD PRESSURE: 97 MMHG | SYSTOLIC BLOOD PRESSURE: 137 MMHG | TEMPERATURE: 98 F
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
## 2023-02-14 DIAGNOSIS — S46.912D STRAIN OF LEFT SHOULDER, SUBSEQUENT ENCOUNTER: Primary | ICD-10-CM

## 2023-02-14 PROCEDURE — 20610 LARGE JOINT ASPIRATION/INJECTION: L GLENOHUMERAL: ICD-10-PCS | Mod: TA,S$GLB,, | Performed by: PHYSICIAN ASSISTANT

## 2023-02-14 PROCEDURE — 20610 DRAIN/INJ JOINT/BURSA W/O US: CPT | Mod: TA,S$GLB,, | Performed by: PHYSICIAN ASSISTANT

## 2023-02-14 PROCEDURE — 99213 OFFICE O/P EST LOW 20 MIN: CPT | Mod: 25,S$GLB,, | Performed by: PHYSICIAN ASSISTANT

## 2023-02-14 PROCEDURE — 99213 PR OFFICE/OUTPT VISIT, EST, LEVL III, 20-29 MIN: ICD-10-PCS | Mod: 25,S$GLB,, | Performed by: PHYSICIAN ASSISTANT

## 2023-02-14 RX ORDER — NAPROXEN 500 MG/1
500 TABLET ORAL 2 TIMES DAILY WITH MEALS
Qty: 30 TABLET | Refills: 0 | Status: SHIPPED | OUTPATIENT
Start: 2023-02-14 | End: 2023-03-06 | Stop reason: SDUPTHER

## 2023-02-14 RX ORDER — LIDOCAINE HYDROCHLORIDE 10 MG/ML
4 INJECTION INFILTRATION; PERINEURAL
Status: DISCONTINUED | OUTPATIENT
Start: 2023-02-14 | End: 2023-02-14 | Stop reason: HOSPADM

## 2023-02-14 RX ORDER — BUPIVACAINE HYDROCHLORIDE 5 MG/ML
4 INJECTION, SOLUTION PERINEURAL
Status: DISCONTINUED | OUTPATIENT
Start: 2023-02-14 | End: 2023-02-14 | Stop reason: HOSPADM

## 2023-02-14 RX ORDER — ACETAMINOPHEN 500 MG
1000 TABLET ORAL EVERY 6 HOURS PRN
Qty: 60 TABLET | Refills: 0 | Status: SHIPPED | OUTPATIENT
Start: 2023-02-14 | End: 2023-04-03 | Stop reason: SDUPTHER

## 2023-02-14 RX ORDER — BETAMETHASONE SODIUM PHOSPHATE AND BETAMETHASONE ACETATE 3; 3 MG/ML; MG/ML
6 INJECTION, SUSPENSION INTRA-ARTICULAR; INTRALESIONAL; INTRAMUSCULAR; SOFT TISSUE
Status: DISCONTINUED | OUTPATIENT
Start: 2023-02-14 | End: 2023-02-14 | Stop reason: HOSPADM

## 2023-02-14 RX ADMIN — BUPIVACAINE HYDROCHLORIDE 4 ML: 5 INJECTION, SOLUTION PERINEURAL at 11:02

## 2023-02-14 RX ADMIN — BETAMETHASONE SODIUM PHOSPHATE AND BETAMETHASONE ACETATE 6 MG: 3; 3 INJECTION, SUSPENSION INTRA-ARTICULAR; INTRALESIONAL; INTRAMUSCULAR; SOFT TISSUE at 11:02

## 2023-02-14 RX ADMIN — LIDOCAINE HYDROCHLORIDE 4 ML: 10 INJECTION INFILTRATION; PERINEURAL at 11:02

## 2023-02-14 NOTE — PROCEDURES
Large Joint Aspiration/Injection: L glenohumeral    Date/Time: 2/14/2023 11:30 AM  Performed by: Delbert Aviles PA-C  Authorized by: Delbert Aviles PA-C     Indications:  Pain  Site marked: the procedure site was marked    Timeout: prior to procedure the correct patient, procedure, and site was verified    Prep: patient was prepped and draped in usual sterile fashion    Local anesthesia used?: No      Details:  Needle Size:  25 G  Ultrasonic Guidance for needle placement?: No    Approach:  Anterior  Location:  Shoulder  Site:  L glenohumeral  Medications:  4 mL LIDOcaine HCL 10 mg/ml (1%) 10 mg/mL (1 %); 4 mL BUPivacaine 0.5 % (5 mg/mL); 6 mg betamethasone acetate-betamethasone sodium phosphate 6 mg/mL  Patient tolerance:  Patient tolerated the procedure well with no immediate complications     Left shoulder site marked, prepped with povidone swabs x3, alcohol wipes.

## 2023-02-14 NOTE — LETTER
Urgent Care - [address] [address]  Phone: [phone]  Fax: [phone]  [facility]: [phone]    Pt Name: Lawrence Devi  Injury Date: 01/28/2023   Employee ID: [id number] Date of Treatment: 02/14/2023   Company: [employer]      Appointment Time: 11:15 AM Arrived: 11:22AM   Provider: Delbert Aviles PA-C Time Out:12:45PM     Office Treatment:   1. Strain of left shoulder, subsequent encounter    2. Work related injury      Medications Ordered This Encounter   Medications    acetaminophen (TYLENOL EXTRA STRENGTH) 500 MG tablet    naproxen (NAPROSYN) 500 MG tablet      Patient Instructions: Attention not to aggravate affected area, Daily home exercises/warm soaks, PT to be scheduled once authorized    Restrictions: Limited use of left hand and arm, No lifting/pushing/pulling more than 10 lbs, No above the shoulder/overhead work      PT to be scheduled once authorized      KW

## 2023-02-14 NOTE — PROGRESS NOTES
Subjective:       Patient ID: Lawrence Devi is a 47 y.o. male.    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  He reports no significant improvement since last office visit.  He reports pain in the lateral aspect his shoulder, worse with sleeping on it, overhead activities.  Patient states he has been taking ibuprofen and Aleve with little relief. MEB    Shoulder Injury   The incident occurred at work. The left shoulder is affected. The incident occurred more than 1 week ago. The injury mechanism was a direct blow. The quality of the pain is described as aching, shooting and stabbing. The pain radiates to the left arm and left neck. The pain is at a severity of 8/10. The pain is mild. Associated symptoms include tingling. Pertinent negatives include no numbness. The symptoms are aggravated by movement. He has tried heat for the symptoms. The treatment provided mild relief.     Constitution: Positive for activity change.   Musculoskeletal:  Positive for pain and joint pain.   Skin:  Negative for wound.   Neurological:  Negative for numbness and tingling.      Objective:      Physical Exam  Vitals and nursing note reviewed.   Constitutional:       General: He is not in acute distress.     Appearance: He is well-developed. He is not diaphoretic.   HENT:      Head: Normocephalic and atraumatic.      Right Ear: Hearing and external ear normal.      Left Ear: Hearing and external ear normal.      Nose: Nose normal. No nasal deformity.   Eyes:      General: Lids are normal. No scleral icterus.     Conjunctiva/sclera: Conjunctivae normal.   Neck:      Trachea: Trachea normal.   Cardiovascular:      Pulses: Normal pulses.    Pulmonary:      Effort: Pulmonary effort is normal. No respiratory distress.      Breath sounds: No stridor.   Musculoskeletal:      Left shoulder: Tenderness (Lateral aspect) present. No swelling or deformity. Normal range of motion. Normal pulse.      Cervical back: Normal range of motion.      Comments: Left Shoulder FAROM, TTP lateral aspect and left paraspinal region of C-spine.   Percy O'Price positive.    Skin:     General: Skin is warm and dry.      Capillary Refill: Capillary refill takes less than 2 seconds.   Neurological:      Mental Status: He is alert. He is not disoriented.      GCS: GCS eye subscore is 4. GCS verbal subscore is 5. GCS motor subscore is 6.      Sensory: No sensory deficit.   Psychiatric:         Attention and Perception: He is attentive.         Speech: Speech normal.         Behavior: Behavior normal.       Assessment:       1. Strain of left shoulder, subsequent encounter    2. Work related injury        Plan:         Large Joint Aspiration/Injection: L glenohumeral    Date/Time: 2/14/2023 11:30 AM  Performed by: Delbert Aviles PA-C  Authorized by: Delbert Aviles PA-C     Indications:  Pain  Site marked: the procedure site was marked    Timeout: prior to procedure the correct patient, procedure, and site was verified    Prep: patient was prepped and draped in usual sterile fashion    Local anesthesia used?: No      Details:  Needle Size:  25 G  Ultrasonic Guidance for needle placement?: No    Approach:  Anterior  Location:  Shoulder  Site:  L glenohumeral  Medications:  4 mL LIDOcaine HCL 10 mg/ml (1%) 10 mg/mL (1 %); 4 mL BUPivacaine 0.5 % (5 mg/mL); 6 mg betamethasone acetate-betamethasone sodium phosphate 6 mg/mL  Patient tolerance:  Patient tolerated the procedure well with no immediate complications     Left shoulder site marked, prepped with povidone swabs x3, alcohol wipes.        Medications Ordered This Encounter   Medications    acetaminophen (TYLENOL EXTRA  STRENGTH) 500 MG tablet     Sig: Take 2 tablets (1,000 mg total) by mouth every 6 (six) hours as needed for Pain.     Dispense:  60 tablet     Refill:  0    naproxen (NAPROSYN) 500 MG tablet     Sig: Take 1 tablet (500 mg total) by mouth 2 (two) times daily with meals.     Dispense:  30 tablet     Refill:  0     Patient Instructions: Attention not to aggravate affected area, Daily home exercises/warm soaks, PT to be scheduled once authorized   Restrictions: Limited use of left hand and arm, No lifting/pushing/pulling more than 10 lbs, No above the shoulder/overhead work  Follow up in about 1 week (around 2/21/2023).

## 2023-02-17 ENCOUNTER — CLINICAL SUPPORT (OUTPATIENT)
Dept: REHABILITATION | Facility: HOSPITAL | Age: 48
End: 2023-02-17
Payer: COMMERCIAL

## 2023-02-17 DIAGNOSIS — M25.512 ACUTE PAIN OF LEFT SHOULDER: ICD-10-CM

## 2023-02-17 DIAGNOSIS — M12.812 ROTATOR CUFF ARTHROPATHY OF LEFT SHOULDER: ICD-10-CM

## 2023-02-17 PROCEDURE — 97110 THERAPEUTIC EXERCISES: CPT | Mod: PO

## 2023-02-17 PROCEDURE — 97162 PT EVAL MOD COMPLEX 30 MIN: CPT | Mod: PO
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
## 2023-02-28 ENCOUNTER — CLINICAL SUPPORT (OUTPATIENT)
Dept: REHABILITATION | Facility: HOSPITAL | Age: 48
End: 2023-02-28
Payer: COMMERCIAL

## 2023-02-28 DIAGNOSIS — M25.512 ACUTE PAIN OF LEFT SHOULDER: ICD-10-CM

## 2023-02-28 DIAGNOSIS — M12.812 ROTATOR CUFF ARTHROPATHY OF LEFT SHOULDER: Primary | ICD-10-CM

## 2023-02-28 PROCEDURE — 97140 MANUAL THERAPY 1/> REGIONS: CPT | Mod: PO,CQ

## 2023-02-28 PROCEDURE — 97110 THERAPEUTIC EXERCISES: CPT | Mod: PO,CQ

## 2023-02-28 PROCEDURE — 97112 NEUROMUSCULAR REEDUCATION: CPT | Mod: PO,CQ

## 2023-02-28 NOTE — PROGRESS NOTES
"Workers' [employer] Physical Therapy Daily Treatment Note     Name: Lawrence Devi  Clinic Number: [id number]    Therapy Diagnosis:   Encounter Diagnoses   Name Primary?    Rotator cuff arthropathy of left shoulder Yes    Acute pain of left shoulder      Physician: Delbert Aviles PA-C    Visit Date: 2/28/2023    Physician Orders: PT Eval and Treat   Medical Diagnosis from Referral:   S46.912D (ICD-10-CM) - Strain of left shoulder, subsequent encounter   Y99.0 (ICD-10-CM) - Work related injury      Evaluation Date: 2/17/2023  Authorization Period Expiration: 12/31/23  Plan of Care Expiration: 5/12/2023  Progress Note Due: 3/17/2023  Visit # / Visits authorized: 2/ 12      Time In: 11:00 am  Time Out: 11:44 am   Total Appointment Time (timed & untimed codes): 44 minutes     Precautions: Standard    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  Response to previous treatment: mild soreness  Functional change: ongoing    Pain: 6/10  Location: left shoulder    OBJECTIVE     Objective Measures updated at progress report unless specified.     TREATMENT     Lawrence received the treatments listed below:      therapeutic exercises to develop strength and endurance for 28 minutes including:  1X10 ISO ER step outs vs RTB x 3 steps  1x10 ISO IR step outs vs RTB with x3 steps  1X10 L ISO Flexion with 5" hold with towel on wall  1X10 L ISO Ext with 5" hold  with towel on wall  1x10 Shoulder ABD with 5" hold with towel on wall  Supine serratus punches x20  Serratus wall slides with hands in pillow case x10    manual therapy techniques: Joint mobilizations and Soft tissue Mobilization were applied to the: " "left shoulder for 08 minutes, including:  STM to left pectorals, upper trap    neuromuscular re-education activities to improve: Posture for 08 minutes. The following activities were included:  Scap retraction 20x2"  Rows with green band x20    therapeutic activities to improve functional performance for 00  minutes, including:        PATIENT EDUCATION AND HOME EXERCISES      Home Exercises Provided and Patient Education Provided     Education provided:   - HEP  - performed exercises to tolerance    Written Home Exercises Provided: Patient instructed to cont prior HEP. Exercises were reviewed and Lawrence was able to demonstrate them prior to the end of the session.  Lawrence demonstrated good  understanding of the education provided. See EMR under Patient Instructions for exercises provided during therapy sessions    ASSESSMENT     Lawrence presents to treatment with soreness in his left shoulder. Currently he is not working. Educated him on importance of HEP and performing to tolerance and avoiding overexertion. Discussed holding push ups and other exercises he was previously performing until instructed to do so by therapist. Fair tolerance to exercises performed today with some discomfort noted. Frequent cueing for proper posture/scapular position. Continue to progress patient as tolerated.     The patient's current job specific task deficits include the following: decreased, strength, decreased, decreased AROM, and decreaesed activity tolernace to pulling, pushing, lifting and reaching.     Lawrence Is progressing well towards his goals.   Pt prognosis is Good.     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     Anticipated Barriers for therapy: acuity of current " injury     GOALS:   Short Term Goals:  3 weeks  1.Report decreased L Shoulder pain < / =  4/10  to increase tolerance for functional reaching  2. Increase AROM in L shoulder ER at 90 to 45 degrees   3. Increased strength by 1/3 MMT grade in shoulder abduction to increase tolerance for ADL and work activities.  4. Pt to tolerate HEP to improve ROM and independence with ADL's     Long Term Goals: 6 weeks  1.Report decreased L shoulder pain  < / =  2 /10  to increase tolerance for functional reaching  2.Increase AROM to 60 degrees shoulder ER at 90  3.Increase strength to >/= 4/5 in shoulder abduction to increase tolerance for ADL and work activities.  4. Pt goal: to be able move arm in all directions with no pain.  5. Pt will have improved gcode of CJ (20-40% limited) on FOTO shoulder in order to demonstrate true functional improvement.  6. Pt will return to work full duty, full time.       Plan     Continue to progress per PT POC to return to work    Maya Martinez, PTA   2/28/2023

## 2023-03-03 ENCOUNTER — CLINICAL SUPPORT (OUTPATIENT)
Dept: REHABILITATION | Facility: HOSPITAL | Age: 48
End: 2023-03-03
Payer: COMMERCIAL

## 2023-03-03 DIAGNOSIS — M12.812 ROTATOR CUFF ARTHROPATHY OF LEFT SHOULDER: Primary | ICD-10-CM

## 2023-03-03 DIAGNOSIS — M25.512 ACUTE PAIN OF LEFT SHOULDER: ICD-10-CM

## 2023-03-03 PROCEDURE — 97140 MANUAL THERAPY 1/> REGIONS: CPT | Mod: PO,CQ

## 2023-03-03 PROCEDURE — 97112 NEUROMUSCULAR REEDUCATION: CPT | Mod: PO,CQ

## 2023-03-03 PROCEDURE — 97110 THERAPEUTIC EXERCISES: CPT | Mod: PO,CQ

## 2023-03-03 NOTE — PROGRESS NOTES
"Workers' Compensation Physical Therapy Daily Treatment Note     Name: Lawrence Devi  Clinic Number: [id number]    Therapy Diagnosis:   Encounter Diagnoses   Name Primary?    Rotator cuff arthropathy of left shoulder Yes    Acute pain of left shoulder        Physician: Delbert Aviles PA-C    Visit Date: 3/3/2023    Physician Orders: PT Eval and Treat   Medical Diagnosis from Referral:   S46.912D (ICD-10-CM) - Strain of left shoulder, subsequent encounter   Y99.0 (ICD-10-CM) - Work related injury      Evaluation Date: 2/17/2023  Authorization Period Expiration: 12/31/23  Plan of Care Expiration: 5/12/2023  Progress Note Due: 3/17/2023  Visit # / Visits authorized: 3/ 12      Time In: 1:00 pm  Time Out: 1:45 pm  Total Appointment Time (timed & untimed codes): 45 minutes     Precautions: Standard    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  Response to previous treatment: mild soreness  Functional change: ongoing    Pain: 4/10  Location: left shoulder    OBJECTIVE     Objective Measures updated at progress report unless specified.     TREATMENT     Lawrence received the treatments listed below:      therapeutic exercises to develop strength and endurance for 27 minutes including:  1X10 ISO ER step outs vs RTB x 3 steps  1x10 ISO IR step outs vs RTB with x3 steps  1X10 L ISO Flexion with 5" hold with towel on wall  1X10 L ISO Ext with 5" hold  with towel on wall  1x10 Shoulder ABD with 5" hold with towel on wall  Supine serratus punches x20  Serratus wall slides with hands in pillow case x10 - not performed    manual therapy techniques: Joint mobilizations and Soft tissue Mobilization were applied to the: left " "shoulder for 10 minutes, including:  STM to left pectorals, upper trap  Inferior/posterior GH joint mobilizations     neuromuscular re-education activities to improve: Posture for 08 minutes. The following activities were included:  Scap retraction 20x2"  Rows with green band x20    therapeutic activities to improve functional performance for 00  minutes, including:        PATIENT EDUCATION AND HOME EXERCISES      Home Exercises Provided and Patient Education Provided     Education provided:   - HEP  - performed exercises to tolerance    Written Home Exercises Provided: Patient instructed to cont prior HEP. Exercises were reviewed and Lawrence was able to demonstrate them prior to the end of the session.  Lawrence demonstrated good  understanding of the education provided. See EMR under Patient Instructions for exercises provided during therapy sessions    ASSESSMENT     Lawrence presents to treatment with continued soreness in left shoulder. He reports he has not had a follow up with the doctor in over two weeks. Per the last doctors note he was to follow up in one week. Advised him of this and recommended he call to schedule a follow up. Fair tolerance to exercises with soreness noted with most movements and cueing required for modifications. Favorable response to manual therapy interventions, especially mobilizations. Continue to progress shoulder strength and mobility as tolerated.     The patient's current job specific task deficits include the following: decreased, strength, decreased, decreased AROM, and decreaesed activity tolernace to pulling, pushing, lifting and reaching.     Lawrence Is progressing well towards his goals.   Pt prognosis is Good.     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     Anticipated Barriers for therapy: acuity of current injury     GOALS:   Short Term Goals:  3 weeks  1.Report decreased L Shoulder pain < / =  4/10  to increase tolerance for functional reaching  2. Increase AROM in L shoulder ER at 90 to 45 degrees   3. Increased strength by 1/3 MMT grade in shoulder abduction to increase tolerance for ADL and work activities.  4. Pt to tolerate HEP to improve ROM and independence with ADL's     Long Term Goals: 6 weeks  1.Report decreased L shoulder pain  < / =  2 /10  to increase tolerance for functional reaching  2.Increase AROM to 60 degrees shoulder ER at 90  3.Increase strength to >/= 4/5 in shoulder abduction to increase tolerance for ADL and work activities.  4. Pt goal: to be able move arm in all directions with no pain.  5. Pt will have improved gcode of CJ (20-40% limited) on FOTO shoulder in order to demonstrate true functional improvement.  6. Pt will return to work full duty, full time.       Plan     Continue to progress per PT POC to return to work    Maya Martinez, PTA   3/3/2023

## 2023-03-06 ENCOUNTER — OFFICE VISIT (OUTPATIENT)
Dept: URGENT CARE | Facility: CLINIC | Age: 48
End: 2023-03-06
Payer: COMMERCIAL

## 2023-03-06 VITALS
TEMPERATURE: 98 F | DIASTOLIC BLOOD PRESSURE: 104 MMHG | OXYGEN SATURATION: 95 % | HEART RATE: 71 BPM | SYSTOLIC BLOOD PRESSURE: 145 MMHG

## 2023-03-06 DIAGNOSIS — Z02.6 ENCOUNTER RELATED TO WORKER'S COMPENSATION CLAIM: Primary | ICD-10-CM

## 2023-03-06 DIAGNOSIS — S46.912D LEFT SHOULDER STRAIN, SUBSEQUENT ENCOUNTER: ICD-10-CM

## 2023-03-06 DIAGNOSIS — S46.912D STRAIN OF LEFT SHOULDER, SUBSEQUENT ENCOUNTER: ICD-10-CM

## 2023-03-06 PROCEDURE — 99214 PR OFFICE/OUTPT VISIT, EST, LEVL IV, 30-39 MIN: ICD-10-PCS | Mod: S$GLB,,, | Performed by: PHYSICIAN ASSISTANT

## 2023-03-06 PROCEDURE — 99214 OFFICE O/P EST MOD 30 MIN: CPT | Mod: S$GLB,,, | Performed by: PHYSICIAN ASSISTANT

## 2023-03-06 RX ORDER — NAPROXEN 500 MG/1
500 TABLET ORAL 2 TIMES DAILY PRN
Qty: 28 TABLET | Refills: 0 | Status: SHIPPED | OUTPATIENT
Start: 2023-03-06 | End: 2023-03-14 | Stop reason: SDUPTHER

## 2023-03-06 RX ORDER — CYCLOBENZAPRINE HCL 5 MG
5 TABLET ORAL NIGHTLY PRN
Qty: 14 TABLET | Refills: 0 | Status: SHIPPED | OUTPATIENT
Start: 2023-03-06 | End: 2023-03-20

## 2023-03-06 NOTE — LETTER
Urgent Care - [address] [address]  Phone: [phone]  Fax: [phone]  [place] [employer] [facility]: [phone]    Pt Name: Lawrence Barrow Date: 01/28/2023   Employee ID: [id number] Date of Treatment: 03/06/2023   Company: [employer]      Appointment Time: 10:15 AM Arrived: 10:40 AM   Provider: Sary Bernardo PA-C Time Out: 11:30 AM     Office Treatment:   1. Encounter related to worker's compensation claim    2. Left shoulder strain, subsequent encounter    3. Strain of left shoulder, subsequent encounter      Medications Ordered This Encounter   Medications    cyclobenzaprine (FLEXERIL) 5 MG tablet    naproxen (NAPROSYN) 500 MG tablet      Patient Instructions: Attention not to aggravate affected area, PT to be scheduled once authorized      Restrictions: No lifting/pushing/pulling more than 10 lbs, No above the shoulder/overhead work, Limited use of left hand and arm     Return Appointment: 3/16/2023 at 11:30 AM    eleanor

## 2023-03-06 NOTE — PROGRESS NOTES
Subjective:       Patient ID: Lawrence Devi is a 47 y.o. male.    Chief Complaint: Shoulder Injury (LEFT SHOULDER)    Mr. Redd presents for follow up of left shoulder injury, DOI 1/30/23.  He is a  with [employer].  He reports his pain is unchanged.  He did get relief from the steroid injection that was given at his last visit, but now is back to the baseline pain.  He does have some tingling intermittently at the top of his shoulder.  He is also having popping of the shoulder.  He has the most pain with overhead movements and at night.  He has been taking naproxen with some relief in his symptoms.     See MA Note Below.  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 The left shoulder is affected. The incident occurred more than 1 week ago. The injury mechanism was a direct blow. The quality of the pain is described as burning, aching and shooting. The pain radiates to the left arm. The pain is at a severity of 5/10. The pain is mild. Associated symptoms include numbness and tingling. The symptoms are aggravated by movement. He has tried nothing for the symptoms. The treatment provided mild relief.     Constitution: Negative.   HENT:  Positive for nosebleeds.    Neck: neck negative.   Cardiovascular: Negative.    Respiratory: Negative.     Gastrointestinal: Negative.    Musculoskeletal:  Positive for pain and joint pain.   Neurological:  Positive for numbness and tingling.      Objective:      Physical Exam  Vitals and nursing note reviewed.   Constitutional:       General: He is not in acute distress.     Appearance: He is well-developed. He is not diaphoretic.   HENT:      Head: Normocephalic and atraumatic.      Right Ear: Hearing and external ear  normal.      Left Ear: Hearing and external ear normal.      Nose: Nose normal. No nasal deformity.   Eyes:      General: Lids are normal. No scleral icterus.     Conjunctiva/sclera: Conjunctivae normal.   Neck:      Trachea: Trachea normal.   Cardiovascular:      Pulses: Normal pulses.   Pulmonary:      Effort: Pulmonary effort is normal. No respiratory distress.      Breath sounds: No stridor.   Musculoskeletal:      Left shoulder: Tenderness (Lateral aspect) present. No swelling or deformity. Normal range of motion. Normal pulse.      Cervical back: Normal range of motion.      Comments: Left Shoulder FAROM, TTP lateral aspect and left paraspinal region of C-spine.   Percy O'Price positive.    Skin:     General: Skin is warm and dry.      Capillary Refill: Capillary refill takes less than 2 seconds.   Neurological:      Mental Status: He is alert. He is not disoriented.      GCS: GCS eye subscore is 4. GCS verbal subscore is 5. GCS motor subscore is 6.      Sensory: No sensory deficit.   Psychiatric:         Attention and Perception: He is attentive.         Speech: Speech normal.         Behavior: Behavior normal.       Assessment:       1. Encounter related to worker's compensation claim    2. Left shoulder strain, subsequent encounter    3. Strain of left shoulder, subsequent encounter          Plan:       Will try a muscle relaxer at night PRN.  Continue naproxen BID PRN for pain.  He feels he is making no progress with PT and the pain is the same.  Will have him try the muscle relaxer and PT and see him back in about 2 weeks.  Consider further imaging at that point if no progress is made.    Medications Ordered This Encounter   Medications    cyclobenzaprine (FLEXERIL) 5 MG tablet     Sig: Take 1 tablet (5 mg total) by mouth nightly as needed for Muscle spasms.     Dispense:  14 tablet     Refill:  0    naproxen (NAPROSYN) 500 MG tablet     Sig: Take 1 tablet (500 mg total) by mouth 2 (two) times daily as  needed (pain).     Dispense:  28 tablet     Refill:  0     Discussed no driving with flexeril, only to be used before bed.  Patient Instructions: Attention not to aggravate affected area, PT to be scheduled once authorized   Restrictions: No lifting/pushing/pulling more than 10 lbs, No above the shoulder/overhead work, Limited use of left hand and arm  Follow up in about 10 days (around 3/16/2023).

## 2023-03-08 ENCOUNTER — CLINICAL SUPPORT (OUTPATIENT)
Dept: REHABILITATION | Facility: HOSPITAL | Age: 48
End: 2023-03-08
Payer: COMMERCIAL

## 2023-03-08 DIAGNOSIS — M12.812 ROTATOR CUFF ARTHROPATHY OF LEFT SHOULDER: Primary | ICD-10-CM

## 2023-03-08 DIAGNOSIS — M25.512 ACUTE PAIN OF LEFT SHOULDER: ICD-10-CM

## 2023-03-08 PROCEDURE — 97530 THERAPEUTIC ACTIVITIES: CPT | Mod: PO,CQ

## 2023-03-08 PROCEDURE — 97140 MANUAL THERAPY 1/> REGIONS: CPT | Mod: PO,CQ

## 2023-03-08 PROCEDURE — 97112 NEUROMUSCULAR REEDUCATION: CPT | Mod: PO,CQ

## 2023-03-08 PROCEDURE — 97110 THERAPEUTIC EXERCISES: CPT | Mod: PO,CQ

## 2023-03-08 NOTE — PROGRESS NOTES
"Workers' Compensation Physical Therapy Daily Treatment Note     Name: Lawrence Devi  Clinic Number: [id number]    Therapy Diagnosis:   Encounter Diagnoses   Name Primary?    Rotator cuff arthropathy of left shoulder Yes    Acute pain of left shoulder          Physician: Delbert Aviles PA-C    Visit Date: 3/8/2023    Physician Orders: PT Eval and Treat   Medical Diagnosis from Referral:   S46.912D (ICD-10-CM) - Strain of left shoulder, subsequent encounter   Y99.0 (ICD-10-CM) - Work related injury      Evaluation Date: 2/17/2023  Authorization Period Expiration: 12/31/23  Plan of Care Expiration: 5/12/2023  Progress Note Due: 3/17/2023  Visit # / Visits authorized: 4/ 12      Time In: 1:15 pm  Time Out: 2:08 pm  Total Appointment Time (timed & untimed codes): 53 minutes     Precautions: Standard    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 He reports he is hesitant to take them. He continues to have difficulty sleeping at night and is currently having tingling in his left hand  He was compliant with home exercise program.  Response to previous treatment: mild soreness  Functional change: ongoing    Pain: 4/10  Location: left shoulder    OBJECTIVE     Objective Measures updated at progress report unless specified.     TREATMENT     Lawrence received the treatments listed below:      therapeutic exercises to develop strength and endurance for 10 minutes including:  Supine pec stretch over towel roll 3 min  Supine serratus punches x20    Not performed:  1X10 ISO ER step outs vs RTB x 3 steps  1x10 ISO IR step outs vs RTB with x3 steps  1X10 L ISO Flexion with 5" hold with towel on wall  1X10 L ISO Ext with 5" hold  with " "towel on wall  1x10 Shoulder ABD with 5" hold with towel on wall  Serratus wall slides with hands in pillow case x10     manual therapy techniques: Joint mobilizations and Soft tissue Mobilization were applied to the: left shoulder for 25 minutes, including:  STM to left pectorals, upper trap  Inferior/posterior GH joint mobilizations   Cervical traction - performed by Antoinette Real PT      neuromuscular re-education activities to improve: Posture for 10 minutes. The following activities were included:  Scap retraction 20x2"  Rows with green band x20    therapeutic activities to improve functional performance for 08 minutes, including:  Education on posture in all positions to decrease pain in shoulder and tingling in hand  Instruction on sleep positions and use of pillows/positioning to decrease pain at night      PATIENT EDUCATION AND HOME EXERCISES      Home Exercises Provided and Patient Education Provided     Education provided:   - HEP  - performed exercises to tolerance    Written Home Exercises Provided: Patient instructed to cont prior HEP. Exercises were reviewed and Lawrence was able to demonstrate them prior to the end of the session.  Lawrence demonstrated good  understanding of the education provided. See EMR under Patient Instructions for exercises provided during therapy sessions    ASSESSMENT     Lawrence presents to treatment with continued left shoulder soreness as well as tingling in his left hand. Cervical traction was performed by supervising PT, Antoinette Real. He reported a decrease in tingling initially that started to return. Following sustained cervical traction he reported tingling resolved and did not return for the remainder of the treatment session. Extensive education on posture and sleep position were provided. He required cueing during rows today for proper technique and muscle activation to promote improved posture. Exercises were modified and not all were performed today due to increased " pain and time spent on manual therapy and education. Continue to progress as tolerated.     The patient's current job specific task deficits include the following: decreased, strength, decreased, decreased AROM, and decreaesed activity tolernace to pulling, pushing, lifting and reaching.     Lawrence Is progressing well towards his goals.   Pt prognosis is Good.     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     Anticipated Barriers for therapy: acuity of current injury     GOALS:   Short Term Goals:  3 weeks  1.Report decreased L Shoulder pain < / =  4/10  to increase tolerance for functional reaching  2. Increase AROM in L shoulder ER at 90 to 45 degrees   3. Increased strength by 1/3 MMT grade in shoulder abduction to increase tolerance for ADL and work activities.  4. Pt to tolerate HEP to improve ROM and independence with ADL's     Long Term Goals: 6 weeks  1.Report decreased L shoulder pain  < / =  2 /10  to increase tolerance for functional reaching  2.Increase AROM to 60 degrees shoulder ER at 90  3.Increase strength to >/= 4/5 in shoulder abduction to increase tolerance for ADL and work activities.  4. Pt goal: to be able move arm in all directions with no pain.  5. Pt will have improved gcode of CJ (20-40% limited) on FOTO shoulder in order to demonstrate true functional improvement.  6. Pt will return to work full duty, full time.       Plan     Continue to progress per PT POC to return to work    Maya Martinez, PTA   3/8/2023

## 2023-03-14 ENCOUNTER — OFFICE VISIT (OUTPATIENT)
Dept: URGENT CARE | Facility: CLINIC | Age: 48
End: 2023-03-14
Payer: COMMERCIAL

## 2023-03-14 ENCOUNTER — CLINICAL SUPPORT (OUTPATIENT)
Dept: REHABILITATION | Facility: HOSPITAL | Age: 48
End: 2023-03-14
Payer: COMMERCIAL

## 2023-03-14 VITALS
SYSTOLIC BLOOD PRESSURE: 141 MMHG | DIASTOLIC BLOOD PRESSURE: 103 MMHG | TEMPERATURE: 98 F | OXYGEN SATURATION: 97 % | HEART RATE: 68 BPM

## 2023-03-14 DIAGNOSIS — Z02.6 ENCOUNTER RELATED TO WORKER'S COMPENSATION CLAIM: ICD-10-CM

## 2023-03-14 DIAGNOSIS — M25.512 ACUTE PAIN OF LEFT SHOULDER: ICD-10-CM

## 2023-03-14 DIAGNOSIS — M25.512 CHRONIC LEFT SHOULDER PAIN: ICD-10-CM

## 2023-03-14 DIAGNOSIS — S46.912D STRAIN OF LEFT SHOULDER, SUBSEQUENT ENCOUNTER: ICD-10-CM

## 2023-03-14 DIAGNOSIS — S46.912D LEFT SHOULDER STRAIN, SUBSEQUENT ENCOUNTER: Primary | ICD-10-CM
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
## 2023-03-14 DIAGNOSIS — G89.29 CHRONIC LEFT SHOULDER PAIN: ICD-10-CM

## 2023-03-14 DIAGNOSIS — M12.812 ROTATOR CUFF ARTHROPATHY OF LEFT SHOULDER: Primary | ICD-10-CM

## 2023-03-14 PROCEDURE — 97010 HOT OR COLD PACKS THERAPY: CPT | Mod: PO

## 2023-03-14 PROCEDURE — 97112 NEUROMUSCULAR REEDUCATION: CPT | Mod: PO

## 2023-03-14 PROCEDURE — 99213 OFFICE O/P EST LOW 20 MIN: CPT | Mod: S$GLB,,, | Performed by: PHYSICIAN ASSISTANT

## 2023-03-14 PROCEDURE — 97530 THERAPEUTIC ACTIVITIES: CPT | Mod: PO

## 2023-03-14 PROCEDURE — 97110 THERAPEUTIC EXERCISES: CPT | Mod: PO

## 2023-03-14 PROCEDURE — 99213 PR OFFICE/OUTPT VISIT, EST, LEVL III, 20-29 MIN: ICD-10-PCS | Mod: S$GLB,,, | Performed by: PHYSICIAN ASSISTANT

## 2023-03-14 PROCEDURE — 97140 MANUAL THERAPY 1/> REGIONS: CPT | Mod: PO

## 2023-03-14 RX ORDER — NAPROXEN 500 MG/1
500 TABLET ORAL 2 TIMES DAILY PRN
Qty: 30 TABLET | Refills: 0 | Status: SHIPPED | OUTPATIENT
Start: 2023-03-14 | End: 2023-04-03 | Stop reason: SDUPTHER

## 2023-03-14 RX ORDER — DIAZEPAM 5 MG/1
5 TABLET ORAL
Qty: 1 TABLET | Refills: 0 | Status: SHIPPED | OUTPATIENT
Start: 2023-03-14 | End: 2023-03-17 | Stop reason: SDUPTHER

## 2023-03-14 NOTE — PROGRESS NOTES
"Workers' Compensation Physical Therapy Daily Treatment Note     Name: Lawrence Devi  Clinic Number: [id number]    Therapy Diagnosis:   Encounter Diagnoses   Name Primary?    Rotator cuff arthropathy of left shoulder Yes    Acute pain of left shoulder          Physician: Delbert Aviles PA-C    Visit Date: 3/14/2023    Physician Orders: PT Eval and Treat   Medical Diagnosis from Referral:   S46.912D (ICD-10-CM) - Strain of left shoulder, subsequent encounter   Y99.0 (ICD-10-CM) - Work related injury      Evaluation Date: 2/17/2023  Authorization Period Expiration: 12/31/23  Plan of Care Expiration: 5/12/2023  Progress Note Due: 3/17/2023  Visit # / Visits authorized: 5/ 12      Time In: 11:02 pm  Time Out: 12:09 pm  Total Appointment Time (timed & untimed codes):  67 minutes     Precautions: Standard    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 I haven't really been in a whole lot of pain. I slept much better last night."  He was compliant with home exercise program.  Response to previous treatment: mild soreness  Functional change: Improved pain    Pain: 2/10   Location: left shoulder    OBJECTIVE     Objective Measures updated at progress report unless specified.     TREATMENT     Lawrence received the treatments listed below:      therapeutic exercises to develop strength and endurance for 25 minutes including:  Supine pec stretch over towel roll 3 min  Supine serratus punches 1x20  1X10 L ISO Flexion with 5" hold with towel on wall  1X10 L ISO Ext with 5" hold  with towel on wall  1x10 Shoulder ABD with 5" hold with towel on wall  Serratus wall slides with hands in pillow case 1x10     manual therapy techniques: Joint mobilizations and Soft " "tissue Mobilization were applied to the: left shoulder for 10 minutes, including:  STM to left pectorals, upper trap, Subscapularis,  Cervical traction manual    neuromuscular re-education activities to improve: Posture, shoulder stabilization for 10 minutes. The following activities were included:  Shoulder stabilization at 90 in supine with graded therapist resist 3x30"  Wall circles with red weighted ball 2x30" CW/CCW    therapeutic activities to improve functional performance for 08 minutes, including:  Education provided on continued need for PT for decreased pain and shoulder stabilization and for progression of HEP      Lawrence received cold pack for 12 minutes to to decrease circulation, pain, and swelling.      PATIENT EDUCATION AND HOME EXERCISES      Home Exercises Provided and Patient Education Provided     Education provided:   - HEP  - performed exercises to tolerance and without pain incurease    Written Home Exercises Provided: Patient instructed to cont prior HEP. Exercises were reviewed and Lawrence was able to demonstrate them prior to the end of the session.  Lawrence demonstrated good  understanding of the education provided. See EMR under Patient Instructions for exercises provided during therapy sessions    ASSESSMENT     Lawrence presents today with less pain on initiation of visit, however, with therex pt noted with continued pain in shoulder with work specific reaching tasks. Pt continues to be limited by pain with reaching, decreased shoulder stabilization, and decreased activity tolerance. Pt educated to continue to performs tasks in pain free ROM and requires intermittent cues during treatment session to maintain this.    The patient's current job specific task deficits include the following: decreased, strength, decreased, decreased AROM, and decreaesed activity tolernace to pulling, pushing, lifting and reaching.     Lawrence Is progressing well towards his goals.   Pt prognosis is Good.     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     Anticipated Barriers for therapy: acuity of current injury     GOALS:   Short Term Goals:  3 weeks  1.Report decreased L Shoulder pain < / =  4/10  to increase tolerance for functional reaching -Progressing   2. Increase AROM in L shoulder ER at 90 to 45 degrees -Progressing  3. Increased strength by 1/3 MMT grade in shoulder abduction to increase tolerance for ADL and work activities. -Progressing  4. Pt to tolerate HEP to improve ROM and independence with ADL's -Progressing     Long Term Goals: 6 weeks  1.Report decreased L shoulder pain  < / =  2 /10  to increase tolerance for functional reaching -Progressing  2.Increase AROM to 60 degrees shoulder ER at 90 -Progressing  3.Increase strength to >/= 4/5 in shoulder abduction to increase tolerance for ADL and work activities. -Progressing  4. Pt goal: to be able move arm in all directions with no pain. -Progressing  5. Pt will have improved gcode of CJ (20-40% limited) on FOTO shoulder in order to demonstrate true functional improvement. -Progressing  6. Pt will return to work full duty, full time. -Progressing       Plan   Plan of care Certification: 2/17/2023 to 5/12/2023.    Continue to progress per PT POC to return to work    Antoinette Real, PT   3/14/2023

## 2023-03-14 NOTE — PROGRESS NOTES
"Subjective:       Patient ID: Lawrence Devi is a 47 y.o. male.    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  He reports minimal improvement since last office visit.  He is currently engaged in physical therapy.  Patient reports moderate pain, worse with overhead activity or lying on his left side.  Patient reports having tingling sensation in the left thumb.  He received traction of the C-spine at physical therapy which resolved his symptoms.  Currently has no radicular symptoms at this time.  Patient has been taking naproxen twice daily.  Patient reports feeling a "clicking" in the left shoulder joint. MEB  ksd    Shoulder Pain   Associated symptoms include a limited range of motion. Pertinent negatives include no numbness.   Constitution: Positive for activity change.   Musculoskeletal:  Positive for pain, joint pain and abnormal ROM of joint.   Neurological:  Negative for numbness and tingling.      Objective:      Physical Exam  Vitals and nursing note reviewed.   Constitutional:       General: He is not in acute distress.     Appearance: He is well-developed. He is not diaphoretic.   HENT:      Head: Normocephalic and atraumatic.      Right Ear: Hearing and external ear normal.      Left Ear: Hearing and external ear normal.      Nose: Nose normal. No nasal deformity.   Eyes:      General: Lids are normal. No scleral icterus.     Conjunctiva/sclera: Conjunctivae normal.   Neck:      Trachea: Trachea normal.   Cardiovascular:      Pulses: Normal pulses.   Pulmonary:      Effort: Pulmonary effort is normal. No respiratory distress.      Breath sounds: No " stridor.   Musculoskeletal:      Left shoulder: Tenderness (Lateral aspect) present. No swelling or deformity. Normal range of motion. Normal pulse.      Cervical back: Normal range of motion.      Comments: Left Shoulder FAROM, TTP lateral aspect and left paraspinal region of C-spine.   PercyBHAVIN'Price positive.    Skin:     General: Skin is warm and dry.      Capillary Refill: Capillary refill takes less than 2 seconds.   Neurological:      Mental Status: He is alert. He is not disoriented.      GCS: GCS eye subscore is 4. GCS verbal subscore is 5. GCS motor subscore is 6.      Sensory: No sensory deficit.   Psychiatric:         Attention and Perception: He is attentive.         Speech: Speech normal.         Behavior: Behavior normal.       Assessment:       1. Left shoulder strain, subsequent encounter    2. Encounter related to worker's compensation claim    3. Work related injury    4. Strain of left shoulder, subsequent encounter          Plan:         Continues to have significant left shoulder pain despite several weeks of conservative treatment.  He is showing signs of rotator cuff and/or labral injury.  Will order MRI at this point.  Valium provided for MRI procedure as patient is claustrophobic.  Patient instructed not to drive after taking Valium.  He will continue physical therapy, home exercises and warm soaks.  I will see patient back in 2 weeks or sooner as needed    Medications Ordered This Encounter   Medications    diazePAM (VALIUM) 5 MG tablet     Sig: Take 1 tablet (5 mg total) by mouth On call Procedure for Anxiety or Insomnia (Take 1 hour prior to MRI. Do not drive for 12 hours after taking.).     Dispense:  1 tablet     Refill:  0    naproxen (NAPROSYN) 500 MG tablet     Sig: Take 1 tablet (500 mg total) by mouth 2 (two) times daily as needed (pain).     Dispense:  30 tablet     Refill:  0     Patient Instructions: Daily home exercises/warm soaks, Continue Physical Therapy, MRI to be scheduled  once authorized   Restrictions: Limited use of left hand and arm, No above the shoulder/overhead work, No lifting/pushing/pulling more than 10 lbs  Follow up in about 2 weeks (around 3/28/2023).

## 2023-03-14 NOTE — LETTER
Urgent Care - [address] [address]  Phone: [phone]  Fax: [phone]  [facility]: [id number]    Pt Name: Lawrence Devi  Injury Date: 01/28/2023   Employee ID: [id number] Date of Treatment: 03/14/2023   Company: [employer]      Appointment Time: 12:45 PM Arrived: 12:45 PM   Provider: Delbert Aviles PA-C Time Out: 2:00 PM     Office Treatment:   1. Left shoulder strain, subsequent encounter    2. Encounter related to worker's compensation claim    3. Work related injury    4. Strain of left shoulder, subsequent encounter      Medications Ordered This Encounter   Medications    naproxen (NAPROSYN) 500 MG tablet      Patient Instructions: Daily home exercises/warm soaks, Continue Physical Therapy, MRI to be scheduled once authorized      Restrictions: Limited use of left hand and arm, No above the shoulder/overhead work, No lifting/pushing/pulling more than 10 lbs     Return Appointment: 3/28/2023 at 2:00 PM    eleanor

## 2023-03-17 ENCOUNTER — TELEPHONE (OUTPATIENT)
Dept: URGENT CARE | Facility: CLINIC | Age: 48
End: 2023-03-17
Payer: COMMERCIAL

## 2023-03-17 ENCOUNTER — HOSPITAL ENCOUNTER (OUTPATIENT)
Dept: RADIOLOGY | Facility: HOSPITAL | Age: 48
Discharge: HOME | End: 2023-03-17
Attending: PHYSICIAN ASSISTANT
Payer: COMMERCIAL

## 2023-03-17 DIAGNOSIS — M25.512 CHRONIC LEFT SHOULDER PAIN: ICD-10-CM

## 2023-03-17 DIAGNOSIS — G89.29 CHRONIC LEFT SHOULDER PAIN: ICD-10-CM

## 2023-03-17 RX ORDER — DIAZEPAM 5 MG/1
10 TABLET ORAL
Qty: 1 TABLET | Refills: 0 | Status: SHIPPED | OUTPATIENT
Start: 2023-03-17 | End: 2023-03-24

## 2023-03-17 NOTE — TELEPHONE ENCOUNTER
Returned patient's call regarding MRI procedure.  He states he could not tolerate the MRI.  Says he tried to keep his eyes closed however when he opens them he started the panic even though a took Valium 5 mg and they had to discontinue the procedure.  He says he was rescheduled for a bigger machine 03/31/2023.  I will provide diazepam 10 mg to be taken prior to next procedure.  Patient thanked me for the call.

## 2023-03-24 RX ORDER — DIAZEPAM 5 MG/1
10 TABLET ORAL
Qty: 2 TABLET | Refills: 0 | Status: SHIPPED | OUTPATIENT
Start: 2023-03-24 | End: 2024-03-23

## 2023-03-28 ENCOUNTER — CLINICAL SUPPORT (OUTPATIENT)
Dept: REHABILITATION | Facility: HOSPITAL | Age: 48
End: 2023-03-28
Payer: COMMERCIAL

## 2023-03-28 DIAGNOSIS — M12.812 ROTATOR CUFF ARTHROPATHY OF LEFT SHOULDER: Primary | ICD-10-CM

## 2023-03-28 DIAGNOSIS — M25.512 ACUTE PAIN OF LEFT SHOULDER: ICD-10-CM

## 2023-03-28 PROCEDURE — 97110 THERAPEUTIC EXERCISES: CPT | Mod: PO,CQ

## 2023-03-28 PROCEDURE — 97140 MANUAL THERAPY 1/> REGIONS: CPT | Mod: PO,CQ

## 2023-03-28 PROCEDURE — 97112 NEUROMUSCULAR REEDUCATION: CPT | Mod: PO,CQ

## 2023-03-28 NOTE — PROGRESS NOTES
"Workers' Compensation Physical Therapy Daily Treatment Note     Name: Lawrence Devi  Clinic Number: [id number]    Therapy Diagnosis:   Encounter Diagnoses   Name Primary?    Rotator cuff arthropathy of left shoulder Yes    Acute pain of left shoulder            Physician: Delbert Aviles PA-C    Visit Date: 3/28/2023    Physician Orders: PT Eval and Treat   Medical Diagnosis from Referral:   S46.912D (ICD-10-CM) - Strain of left shoulder, subsequent encounter   Y99.0 (ICD-10-CM) - Work related injury      Evaluation Date: 2/17/2023  Authorization Period Expiration: 12/31/23  Plan of Care Expiration: 5/12/2023  Progress Note Due: 3/17/2023  Visit # / Visits authorized: 6/ 12      Time In: 11:00 am  Time Out: 11:44 am  Total Appointment Time (timed & untimed codes):  44 minutes     Precautions: Standard    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   He was compliant with home exercise program.  Response to previous treatment: mild soreness  Functional change: Improved pain    Pain: 5/10   Location: left shoulder    OBJECTIVE     Objective Measures updated at progress report unless specified.     TREATMENT     Lawrence received the treatments listed below:      therapeutic exercises to develop strength and endurance for 44 minutes including:  Supine pec stretch over towel roll 3 min  Supine serratus punches 1x20  1X10 L ISO Flexion with 5" hold with towel on wall  1X10 L ISO Ext with 5" hold  with towel on wall  1x10 Shoulder ABD with 5" hold with towel on wall  Serratus wall slides with hands in pillow case 1x10     manual therapy techniques: Joint mobilizations and Soft tissue Mobilization were applied to the: left shoulder for " "10 minutes, including:  STM to left pectorals, upper trap, Subscapularis,  Inferior/posterior GH joint mobilization    neuromuscular re-education activities to improve: Posture, shoulder stabilization for 10 minutes. The following activities were included:  Shoulder stabilization at 90 in supine with graded therapist resist 3x30"  Wall circles with red weighted ball 2x30" CW/CCW  Rows with BTB 3x10    therapeutic activities to improve functional performance for 00 minutes, including:  Education provided on continued need for PT for decreased pain and shoulder stabilization and for progression of HEP      Lawrence received cold pack for 00 minutes to to decrease circulation, pain, and swelling.      PATIENT EDUCATION AND HOME EXERCISES      Home Exercises Provided and Patient Education Provided     Education provided:   - HEP  - performed exercises to tolerance and without pain incurease    Written Home Exercises Provided: Patient instructed to cont prior HEP. Exercises were reviewed and Lawrence was able to demonstrate them prior to the end of the session.  Lawrence demonstrated good  understanding of the education provided. See EMR under Patient Instructions for exercises provided during therapy sessions    ASSESSMENT     Thalia presents to treatment with increased pain today. He stated he has not had a return of the numbness and tingling in his hand since receiving manual cervical traction in therapy. Lawrence reports he went for an MRI, but had to stop it due to anxiety. He is rescheduled for another one this Friday 3/21. He was challenged by all exercises today due to pain and weakness with discomfort noted with most movements. Emphasis on shoulder strength and stability today. Continue to progress as tolerated.     The patient's current job specific task deficits include the following: decreased, strength, decreased, decreased AROM, and decreaesed activity tolernace to pulling, pushing, lifting and reaching.     Lawrence Is " progressing well towards his goals.   Pt prognosis is Good.     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     Anticipated Barriers for therapy: acuity of current injury     GOALS:   Short Term Goals:  3 weeks  1.Report decreased L Shoulder pain < / =  4/10  to increase tolerance for functional reaching -Progressing   2. Increase AROM in L shoulder ER at 90 to 45 degrees -Progressing  3. Increased strength by 1/3 MMT grade in shoulder abduction to increase tolerance for ADL and work activities. -Progressing  4. Pt to tolerate HEP to improve ROM and independence with ADL's -Progressing     Long Term Goals: 6 weeks  1.Report decreased L shoulder pain  < / =  2 /10  to increase tolerance for functional reaching -Progressing  2.Increase AROM to 60 degrees shoulder ER at 90 -Progressing  3.Increase strength to >/= 4/5 in shoulder abduction to increase tolerance for ADL and work activities. -Progressing  4. Pt goal: to be able move arm in all directions with no pain. -Progressing  5. Pt will have improved gcode of CJ (20-40% limited) on FOTO shoulder in order to demonstrate true functional improvement. -Progressing  6. Pt will return to work full duty, full time. -Progressing       Plan   Plan of care Certification: 2/17/2023 to 5/12/2023.    Continue to progress per PT POC to return to work    Maya Martinez, PTA   3/28/2023

## 2023-03-31 ENCOUNTER — HOSPITAL ENCOUNTER (OUTPATIENT)
Dept: RADIOLOGY | Facility: HOSPITAL | Age: 48
Discharge: HOME | End: 2023-03-31
Attending: PHYSICIAN ASSISTANT
Payer: COMMERCIAL

## 2023-03-31 ENCOUNTER — CLINICAL SUPPORT (OUTPATIENT)
Dept: REHABILITATION | Facility: HOSPITAL | Age: 48
End: 2023-03-31
Payer: COMMERCIAL

## 2023-03-31 DIAGNOSIS — M12.812 ROTATOR CUFF ARTHROPATHY OF LEFT SHOULDER: Primary | ICD-10-CM

## 2023-03-31 DIAGNOSIS — M25.512 ACUTE PAIN OF LEFT SHOULDER: ICD-10-CM

## 2023-03-31 PROCEDURE — 97110 THERAPEUTIC EXERCISES: CPT | Mod: PO

## 2023-03-31 PROCEDURE — 73221 MRI JOINT UPR EXTREM W/O DYE: CPT | Mod: TC,LT

## 2023-03-31 PROCEDURE — 97010 HOT OR COLD PACKS THERAPY: CPT | Mod: PO

## 2023-03-31 PROCEDURE — 97530 THERAPEUTIC ACTIVITIES: CPT | Mod: PO

## 2023-03-31 PROCEDURE — 73221 MRI SHOULDER WITHOUT CONTRAST LEFT: ICD-10-PCS | Mod: 26,LT,, | Performed by: RADIOLOGY

## 2023-03-31 PROCEDURE — 97140 MANUAL THERAPY 1/> REGIONS: CPT | Mod: PO

## 2023-03-31 PROCEDURE — 97112 NEUROMUSCULAR REEDUCATION: CPT | Mod: PO

## 2023-03-31 PROCEDURE — 73221 MRI JOINT UPR EXTREM W/O DYE: CPT | Mod: 26,LT,, | Performed by: RADIOLOGY

## 2023-03-31 NOTE — PROGRESS NOTES
"Workers' Compensation Physical Therapy Daily Treatment Note     Name: Lawrence Devi  Clinic Number: [id number]    Therapy Diagnosis:   Encounter Diagnoses   Name Primary?    Rotator cuff arthropathy of left shoulder Yes    Acute pain of left shoulder        Physician: Delbert Aviles PA-C    Visit Date: 3/31/2023    Physician Orders: PT Eval and Treat   Medical Diagnosis from Referral:   S46.912D (ICD-10-CM) - Strain of left shoulder, subsequent encounter   Y99.0 (ICD-10-CM) - Work related injury      Evaluation Date: 2/17/2023  Authorization Period Expiration: 12/31/23  Plan of Care Expiration: 5/12/2023  Progress Note Due: 4/28/2023 (progress note performed 3/31/2023)  Visit # / Visits authorized: 7/ 12      Time In: 1:00 pm  Time Out: 2:00 pm  Total Appointment Time (timed & untimed codes): 60 minutes     Precautions: Standard    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 But seems to be doing a little better.".   He was compliant with home exercise program.  Response to previous treatment: mild soreness  Functional change: Improved pain    Pain: 5/10   Location: left shoulder    OBJECTIVE     Objective Measures updated at progress report unless specified.     Active Range of Motion:   Shoulder Right Left   Flexion 180 180   Abduction 170 170   ER at 0 75 75   ER at 90 80 80 -mild pain   IR WNL WNL   Reach behind head WNL WNL- PAIN   Reach behind back  WNL WNL- PAIN      Strength:  Shoulder Right Left   Flexion 4+/5 4-/5   Abduction 4+/5 4-/5   ER 4/5 4-/5   IR 4/5 4/5   Middle Trap 4/5 4-/5   Low Trap 4/5 4-/5      Special Tests:  Impingement Cluster:    Right Left   Hawkin's Kenndy - +   Painful Arc - +   Resisted ER - +      Rotator Cuff Tear    Right " "Left   Hawkin's Rogerio - +   Drop Arm test - +   Resisted ER - +      Instability:    Right Left   Anterior Apprehension Test - -   Relocation test - -   Posterior Apprehension Test - -   Sulcus Sign - -      SLAP:    Right Left   Biceps Load II - -   O'Price's Test - -      Other:    Right Left   Subscapularis Lift Off Test - +   Empty Can - +       TREATMENT     Lawrence received the treatments listed below:      therapeutic exercises to develop strength and endurance for 25 minutes including:  Supine serratus punches 1x20 with 5#  1x10 S/L Shoulder ABD with 2#  Serratus wall slides with hands in pillow case 2x15   2x10 S/L Shoulder ER vs 2#  2X15 Shoulder flexion on wall with towel    manual therapy techniques: Joint mobilizations and Soft tissue Mobilization were applied to the: left shoulder for 10 minutes, including:  STM to left pectorals, upper trap, Subscapularis,  Inferior/posterior GH joint mobilization    neuromuscular re-education activities to improve: Posture, shoulder stabilization for 10 minutes. The following activities were included:  Shoulder stabilization at 90 in supine with graded therapist resist 3x30"  Wall circles with red weighted ball 2x30" CW/CCW  Body Blade 2X30" Shoulder Neutral ER/IR oscillations    therapeutic activities to improve functional performance for 10 minutes, including:  -UBE 3' forwards (more pain backward so differed)  -Open Books 2x10  -Modified plank at smith machine 2x10 with alternating reaching  -Objective measures gathered as above      Lawrence received cold pack for 10 minutes to to decrease circulation, pain, and swelling.      PATIENT EDUCATION AND HOME EXERCISES      Home Exercises Provided and Patient Education Provided     Education provided:   - HEP  - performed exercises to tolerance and without pain incurease    Written Home Exercises Provided: Patient instructed to cont prior HEP. Exercises were reviewed and Lawrence was able to demonstrate them prior to the end " of the session.  Lawrence demonstrated good  understanding of the education provided. See EMR under Patient Instructions for exercises provided during therapy sessions    ASSESSMENT     Lawrence was reassess today. Pt noted with improved AROM with apparent decreased pain, however, pt continues to be limited by positive empty can and painful arc indicative of rotator cuff arthropathy. Pt continues to be limited by decreased strength, and impaired AROM due to pain.    The patient's current job specific task deficits include the following: decreased, strength, decreased, decreased AROM, and decreaesed activity tolernace to pulling, pushing, lifting and reaching.     Lawrence Is progressing well towards his goals.   Pt prognosis is Good.     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     Anticipated Barriers for therapy: acuity of current injury     GOALS:   Short Term Goals:  3 weeks  1.Report decreased L Shoulder pain < / =  4/10  to increase tolerance for functional reaching -Progressing   2. Increase AROM in L shoulder ER at 90 to 45 degrees -Progressing  3. Increased strength by 1/3 MMT grade in shoulder abduction to increase tolerance for ADL and work activities. -Progressing  4. Pt to tolerate HEP to improve ROM and independence with ADL's -Progressing     Long Term Goals: 6 weeks  1.Report decreased L shoulder pain  < / =  2 /10  to increase tolerance for functional reaching -Progressing  2.Increase AROM to 60 degrees shoulder ER at 90 -Progressing  3.Increase strength to >/= 4/5 in shoulder abduction to increase tolerance for ADL and work activities. -Progressing  4. Pt goal: to be able move arm in all directions with no pain. -Progressing  5. Pt will have  improved gcode of CJ (20-40% limited) on FOTO shoulder in order to demonstrate true functional improvement. -Progressing  6. Pt will return to work full duty, full time. -Progressing       Plan   Plan of care Certification: 2/17/2023 to 5/12/2023.    Continue to progress per PT POC to return to work    Antoinette Real, PT   3/31/2023

## 2023-04-03 ENCOUNTER — OFFICE VISIT (OUTPATIENT)
Dept: URGENT CARE | Facility: CLINIC | Age: 48
End: 2023-04-03
Payer: COMMERCIAL

## 2023-04-03 VITALS
SYSTOLIC BLOOD PRESSURE: 138 MMHG | HEART RATE: 73 BPM | OXYGEN SATURATION: 95 % | TEMPERATURE: 99 F | DIASTOLIC BLOOD PRESSURE: 96 MMHG

## 2023-04-03 DIAGNOSIS — S46.012S TRAUMATIC INCOMPLETE TEAR OF LEFT ROTATOR CUFF, SEQUELA: ICD-10-CM

## 2023-04-03 DIAGNOSIS — Z02.6 ENCOUNTER RELATED TO WORKER'S COMPENSATION CLAIM: ICD-10-CM

## 2023-04-03 DIAGNOSIS — S46.912D STRAIN OF LEFT SHOULDER, SUBSEQUENT ENCOUNTER: ICD-10-CM

## 2023-04-03 DIAGNOSIS — S43.432S LABRAL TEAR OF SHOULDER, LEFT, SEQUELA: Primary | ICD-10-CM
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
## 2023-04-03 PROCEDURE — 99213 PR OFFICE/OUTPT VISIT, EST, LEVL III, 20-29 MIN: ICD-10-PCS | Mod: S$GLB,,, | Performed by: PHYSICIAN ASSISTANT

## 2023-04-03 PROCEDURE — 99213 OFFICE O/P EST LOW 20 MIN: CPT | Mod: S$GLB,,, | Performed by: PHYSICIAN ASSISTANT

## 2023-04-03 RX ORDER — ACETAMINOPHEN 500 MG
1000 TABLET ORAL EVERY 6 HOURS PRN
Qty: 60 TABLET | Refills: 0 | Status: SHIPPED | OUTPATIENT
Start: 2023-04-03 | End: 2024-04-02

## 2023-04-03 RX ORDER — NAPROXEN 500 MG/1
500 TABLET ORAL 2 TIMES DAILY PRN
Qty: 30 TABLET | Refills: 0 | Status: SHIPPED | OUTPATIENT
Start: 2023-04-03 | End: 2023-04-18

## 2023-04-03 NOTE — LETTER
Urgent Care - [address] [address]  Phone: [phone]  Fax: [phone]  [facility]: [id number]    Pt Name: Lawrence Devi  Injury Date: 01/28/2023   Employee ID: [id number] Date of Treatment: 04/03/2023   Company: [employer] to [employer]      Appointment Time: 09:15 AM Arrived: 9:38 AM   Provider: Delbert Aviles PA-C Time Out: 10:27 AM     Office Treatment:   1. Labral tear of shoulder, left, sequela    2. Encounter related to worker's compensation claim    3. Traumatic incomplete tear of left rotator cuff, sequela    4. Work related injury    5. Strain of left shoulder, subsequent encounter      Medications Ordered This Encounter   Medications    acetaminophen (TYLENOL EXTRA STRENGTH) 500 MG tablet    naproxen (NAPROSYN) 500 MG tablet      Patient Instructions: Daily home exercises/warm soaks, Referral to specialist to be scheduled, once authorized      Restrictions:  (Duty not to aggravate left shoulder.)     Return Appointment: DISCHARGED FROM [facility]. REFERRED TO ORTHO.    eleanor

## 2023-04-03 NOTE — PROGRESS NOTES
Subjective:      Patient ID: Lawrence Devi is a 47 y.o. male.    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  He reports slight improvement since last office visit.  He is currently conducting physical therapy.  Patient had an MRI in the interim.  He is here for results and disposition. MEB    Shoulder Injury     Constitution: Positive for activity change.   Musculoskeletal:  Positive for pain, joint pain and abnormal ROM of joint.   Neurological:  Positive for tingling.   Psychiatric/Behavioral:  Positive for sleep disturbance.    Objective:     Physical Exam  Vitals and nursing note reviewed.   Constitutional:       General: He is not in acute distress.     Appearance: He is well-developed. He is not diaphoretic.   HENT:      Head: Normocephalic and atraumatic.      Right Ear: Hearing and external ear normal.      Left Ear: Hearing and external ear normal.      Nose: Nose normal. No nasal deformity.   Eyes:      General: Lids are normal. No scleral icterus.     Conjunctiva/sclera: Conjunctivae normal.   Neck:      Trachea: Trachea normal.   Cardiovascular:      Pulses: Normal pulses.   Pulmonary:      Effort: Pulmonary effort is normal. No respiratory distress.      Breath sounds: No stridor.   Musculoskeletal:      Cervical back: Normal range of motion.   Skin:     General: Skin is warm and dry.      Capillary Refill: Capillary refill takes less than 2 seconds.   Neurological:      Mental Status: He is alert. He is not disoriented.      GCS: GCS eye subscore is 4. GCS verbal subscore is 5. GCS motor subscore is 6.      Sensory: No sensory deficit.   Psychiatric:         Attention and  Perception: He is attentive.         Speech: Speech normal.         Behavior: Behavior normal.        MRI Shoulder Without Contrast Left    Result Date: 4/1/2023  EXAMINATION: MRI SHOULDER WITHOUT CONTRAST LEFT CLINICAL HISTORY: Shoulder pain, rotator cuff disorder suspected, xray done; TECHNIQUE: Routine MRI evaluation of the left shoulder performed without contrast using the following sequences: Coronal PD, T2 FS; Sagittal T2 FS, T1; axial PD FS. COMPARISON: Radiograph 01/30/2023. FINDINGS: ROTATOR CUFF: Supraspinatus and infraspinatus tendinosis with a medium-sized (1.8 cm AP) high-grade partial (greater than 50%) bursal surface tear of the conjoined tendon footprint that demonstrates interstitial extension into the anterior supraspinatus and posterior infraspinatus footprints.  Small intramuscular cyst at the infraspinatus myotendinous junction.  Subscapularis and teres minor tendons are intact.  There is a 2.5 x 4.6 cm subscapularis intramuscular lipoma.  Muscle bulk is preserved. LABRUM: Abnormal morphology and signal intensity of the superior labrum extending from anterior to posterior.  Hypoplastic superior labrum with slight hypertrophy of the middle glenohumeral ligament.  No paralabral cyst. BICEPS: Mild thickening and increased signal of the intra-articular biceps tendon.  Small volume of tendon sheath fluid at the level of the bicipital groove. BONES: Mild subcortical edema and cystic change at the superior facet of the greater tuberosity.  No fracture.  No avascular necrosis.  No marrow infiltrative process. AC JOINT: Moderate AC joint arthrosis with associated subchondral edema and cystic change. CARTILAGE: Intact without partial or full-thickness defects. MISCELLANEOUS: No joint effusion.  Trace volume of subacromial/subdeltoid bursal fluid.  Superior, middle and inferior glenohumeral ligaments are intact.  No axillary lymphadenopathy.     1. Supraspinatus and infraspinatus tendinosis with a  medium-sized, high-grade partial-thickness bursal surface tear with associated interstitial/concealed components at the anterior supraspinatus and posterior infraspinatus footprints.  No full-thickness tear or retraction.  No volume loss. 2. Superior labral tear that extends from anterior to posterior.  Suspected [place] complex. 3. Mild biceps tendinosis. 4. AC joint arthrosis. 5. Subscapularis intramuscular lipoma. Electronically signed by: Yobani Greco MD Date:    04/01/2023 Time:    14:21     Assessment:      1. Labral tear of shoulder, left, sequela    2. Encounter related to worker's compensation claim    3. Traumatic incomplete tear of left rotator cuff, sequela    4. Work related injury    5. Strain of left shoulder, subsequent encounter      Plan:         Patient will be referred to ortho.  Instructed to continue home exercises and warm soaks.  He may take naproxen twice daily and/or Tylenol 4 times daily as needed for pain.    Medications Ordered This Encounter   Medications    acetaminophen (TYLENOL EXTRA STRENGTH) 500 MG tablet     Sig: Take 2 tablets (1,000 mg total) by mouth every 6 (six) hours as needed for Pain.     Dispense:  60 tablet     Refill:  0    naproxen (NAPROSYN) 500 MG tablet     Sig: Take 1 tablet (500 mg total) by mouth 2 (two) times daily as needed (pain).     Dispense:  30 tablet     Refill:  0     Patient Instructions: Daily home exercises/warm soaks, Referral to specialist to be scheduled, once authorized   Restrictions:  (Duty not to aggravate left shoulder.)  No follow-ups on file.

## 2023-04-06 ENCOUNTER — CLINICAL SUPPORT (OUTPATIENT)
Dept: REHABILITATION | Facility: HOSPITAL | Age: 48
End: 2023-04-06
Payer: COMMERCIAL

## 2023-04-06 DIAGNOSIS — M25.512 ACUTE PAIN OF LEFT SHOULDER: ICD-10-CM

## 2023-04-06 DIAGNOSIS — M12.812 ROTATOR CUFF ARTHROPATHY OF LEFT SHOULDER: Primary | ICD-10-CM

## 2023-04-06 PROCEDURE — 97140 MANUAL THERAPY 1/> REGIONS: CPT | Mod: PO,CQ

## 2023-04-06 PROCEDURE — 97112 NEUROMUSCULAR REEDUCATION: CPT | Mod: PO,CQ

## 2023-04-06 PROCEDURE — 97530 THERAPEUTIC ACTIVITIES: CPT | Mod: PO,CQ

## 2023-04-06 PROCEDURE — 97110 THERAPEUTIC EXERCISES: CPT | Mod: PO,CQ

## 2023-04-06 NOTE — PROGRESS NOTES
"Workers' [employer] Physical Therapy Daily Treatment Note     Name: Lawrence Devi  Clinic Number: [id number]    Therapy Diagnosis:   Encounter Diagnoses   Name Primary?    Rotator cuff arthropathy of left shoulder Yes    Acute pain of left shoulder        Physician: Delbert Aviles PA-C    Visit Date: 4/6/2023    Physician Orders: PT Eval and Treat   Medical Diagnosis from Referral:   S46.912D (ICD-10-CM) - Strain of left shoulder, subsequent encounter   Y99.0 (ICD-10-CM) - Work related injury      Evaluation Date: 2/17/2023  Authorization Period Expiration: 12/31/23  Plan of Care Expiration: 5/12/2023  Progress Note Due: 4/28/2023 (progress note performed 3/31/2023)  Visit # / Visits authorized: 8/ 12      Time In: 7:01 am  Time Out: 7:42 am  Total Appointment Time (timed & untimed codes): 41 minutes     Precautions: Standard    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  Response to previous treatment: mild soreness  Functional change: Improved pain    Pain: 5/10   Location: left shoulder    OBJECTIVE     Objective Measures updated at progress report unless specified.       TREATMENT     Lawrence received the treatments listed below:      Bold activities performed today    therapeutic exercises to develop strength and endurance for 08 minutes including:  Supine serratus punches 1x20 with 3#  2x10 S/L Shoulder ABD  Serratus wall slides with hands in pillow case 2x15   2x10 S/L Shoulder ER  2X15 Shoulder flexion on wall with towel    manual therapy techniques: Joint mobilizations and Soft tissue Mobilization were applied to the: left shoulder for " "10 minutes, including:  STM to left pectorals, upper trap, Subscapularis,  Inferior/posterior GH joint mobilization    neuromuscular re-education activities to improve: Posture, shoulder stabilization for 13 minutes. The following activities were included:  Shoulder stabilization at 90 in supine with graded therapist resist 3x30"  Wall circles with yellow ball x30 CW/CCW  Shoulder isometrics flex, abd, ext, 10x5"  Body Blade 2X30" Shoulder Neutral ER/IR oscillations    therapeutic activities to improve functional performance for 10 minutes, including:  -UBE 3' forwards, 2' backwards (stopped due to "throbbing")  -Function reaching at wall (stopped due to pain)   - Education on avoiding end range of motion with activities if having increased pain  -Open Books 2x10  -Modified plank at [employer] 2x10 with alternating reaching  -Objective measures gathered as above      Lawrence received cold pack for 00 minutes to to decrease circulation, pain, and swelling.      PATIENT EDUCATION AND HOME EXERCISES      Home Exercises Provided and Patient Education Provided     Education provided:   - HEP  - performed exercises to tolerance and without pain incurease    Written Home Exercises Provided: Patient instructed to cont prior HEP. Exercises were reviewed and Lawrence was able to demonstrate them prior to the end of the session.  Lawrence demonstrated good  understanding of the education provided. See EMR under Patient Instructions for exercises provided during therapy sessions    ASSESSMENT     Lawrence presents to treatment with continued pain and intermittent "throbbing" in his left shoulder. He reports seeing his MD to get the results of his MRI which revealed a SLAP tear of his left shoulder. He is waiting to get authorization to see an orthopedic MD. He continues with pain with most movements. He was educated on avoiding end range of motions if having increased pain. He reported relief with inferior GH joint mobilizations today. " He required verbal and tactile cueing for proper scapular position during side lying exercises to improve ability to perform with less discomfort. Continue to progress as tolerated.     The patient's current job specific task deficits include the following: decreased, strength, decreased, decreased AROM, and decreaesed activity tolernace to pulling, pushing, lifting and reaching.     Lawrence Is progressing well towards his goals.   Pt prognosis is Good.     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     Anticipated Barriers for therapy: acuity of current injury     GOALS:   Short Term Goals:  3 weeks  1.Report decreased L Shoulder pain < / =  4/10  to increase tolerance for functional reaching -Progressing   2. Increase AROM in L shoulder ER at 90 to 45 degrees -Progressing  3. Increased strength by 1/3 MMT grade in shoulder abduction to increase tolerance for ADL and work activities. -Progressing  4. Pt to tolerate HEP to improve ROM and independence with ADL's -Progressing     Long Term Goals: 6 weeks  1.Report decreased L shoulder pain  < / =  2 /10  to increase tolerance for functional reaching -Progressing  2.Increase AROM to 60 degrees shoulder ER at 90 -Progressing  3.Increase strength to >/= 4/5 in shoulder abduction to increase tolerance for ADL and work activities. -Progressing  4. Pt goal: to be able move arm in all directions with no pain. -Progressing  5. Pt will have improved gcode of CJ (20-40% limited) on FOTO shoulder in order to demonstrate true functional improvement. -Progressing  6. Pt will return to work full duty, full time. -Progressing       Plan   Plan of care Certification: 2/17/2023 to 5/12/2023.    Continue to progress per PT POC  to return to work    Maya Martinez, PTA   4/6/2023

## 2023-04-11 ENCOUNTER — CLINICAL SUPPORT (OUTPATIENT)
Dept: REHABILITATION | Facility: HOSPITAL | Age: 48
End: 2023-04-11
Payer: COMMERCIAL

## 2023-04-11 DIAGNOSIS — M25.512 ACUTE PAIN OF LEFT SHOULDER: ICD-10-CM

## 2023-04-11 DIAGNOSIS — M12.812 ROTATOR CUFF ARTHROPATHY OF LEFT SHOULDER: Primary | ICD-10-CM

## 2023-04-11 PROCEDURE — 97140 MANUAL THERAPY 1/> REGIONS: CPT | Mod: PO

## 2023-04-11 PROCEDURE — 97112 NEUROMUSCULAR REEDUCATION: CPT | Mod: PO

## 2023-04-11 PROCEDURE — 97110 THERAPEUTIC EXERCISES: CPT | Mod: PO

## 2023-04-11 PROCEDURE — 97530 THERAPEUTIC ACTIVITIES: CPT | Mod: PO

## 2023-04-11 NOTE — PROGRESS NOTES
"[employer]' [employer] Physical Therapy Daily Treatment Note     Name: Lawrence Devi  Clinic Number: [id number]    Therapy Diagnosis:   Encounter Diagnoses   Name Primary?    Rotator cuff arthropathy of left shoulder Yes    Acute pain of left shoulder          Physician: Delbert Aviles PA-C    Visit Date: 4/11/2023    Physician Orders: PT Eval and Treat   Medical Diagnosis from Referral:   S46.912D (ICD-10-CM) - Strain of left shoulder, subsequent encounter   Y99.0 (ICD-10-CM) - Work related injury      Evaluation Date: 2/17/2023  Authorization Period Expiration: 12/31/23  Plan of Care Expiration: 5/12/2023  Progress Note Due: 4/28/2023 (progress note performed 3/31/2023)  Visit # / Visits authorized: 9/ 12      Time In: 7:01 am  Time Out: 7:42 am  Total Appointment Time (timed & untimed codes): 41 minutes     Precautions: Standard    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 On and off. I'll be fine one moment then all the sudden pain will come up."  He was compliant with home exercise program.  Response to previous treatment: mild soreness  Functional change: Improved pain    Pain: 5/10   Location: left shoulder    OBJECTIVE     Objective Measures updated at progress report unless specified.       TREATMENT     Lawrence received the treatments listed below:      Bold activities performed today    therapeutic exercises to develop strength and endurance for 08 minutes including:  Supine serratus punches 1x20 with 3#  2x10 S/L Shoulder ABD  Serratus wall slides with hands in pillow case 2x15   2x10 S/L Shoulder ER  2X15 Shoulder flexion on wall with towel (finger walking)  Isometric walk outs with YTB EXTERNAL ROTATION, INTERNAL ROTATION, flexion, and " "abduction    manual therapy techniques: Joint mobilizations and Soft tissue Mobilization were applied to the: left shoulder for 10 minutes, including:  STM to left pectorals, upper trap, Subscapularis,  Inferior/posterior GH joint mobilization    neuromuscular re-education activities to improve: Posture, shoulder stabilization for 13 minutes. The following activities were included:  Shoulder stabilization at 90 in supine with graded therapist resist 3x30"  Wall circles with yellow ball x30 CW/CCW  Rhythmic stabilization with serratus punch hold 3x30"  Scapular retractions 3x10  Body Blade 2X30" Shoulder Neutral ER/IR oscillations    therapeutic activities to improve functional performance for 10 minutes, including:  -UBE 3' forwards, 2' backwards (stopped due to "throbbing")  -Function reaching at wall (stopped due to pain)   - Education on avoiding end range of motion with activities if having increased pain  -Open Books 2x10  -Modified plank at smith machine 2x10 with alternating reaching  -Objective measures gathered as above      Lawrence received cold pack for 00 minutes to to decrease circulation, pain, and swelling.      PATIENT EDUCATION AND HOME EXERCISES      Home Exercises Provided and Patient Education Provided     Education provided:   - HEP  - performed exercises to tolerance and without pain incurease    Written Home Exercises Provided: Patient instructed to cont prior HEP. Exercises were reviewed and Lawrence was able to demonstrate them prior to the end of the session.  Lawrence demonstrated good  understanding of the education provided. See EMR under Patient Instructions for exercises provided during therapy sessions    ASSESSMENT     Lawrence presents to treatment with continued pain and intermittent "throbbing" in his left shoulder. Pt continues to have significant pain with active UE lowering and end range UE elevation on the L arm. PT educated pt on different arm positioning to decrease pain and load on L " UE, which pt responded well. Pt is still waiting on MD appointment to orthopedics secondary to pending referral status. PT recommends pt seek orthopedic consultation for further evaluation. Pt remains appropriate for PT services to reduce shoulder pain.    The patient's current job specific task deficits include the following: decreased, strength, decreased, decreased AROM, and decreaesed activity tolernace to pulling, pushing, lifting and reaching.     Lawrence Is progressing well towards his goals.   Pt prognosis is Good.     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     Anticipated Barriers for therapy: acuity of current injury     GOALS:   Short Term Goals:  3 weeks  1.Report decreased L Shoulder pain < / =  4/10  to increase tolerance for functional reaching -Progressing   2. Increase AROM in L shoulder ER at 90 to 45 degrees -Progressing  3. Increased strength by 1/3 MMT grade in shoulder abduction to increase tolerance for ADL and work activities. -Progressing  4. Pt to tolerate HEP to improve ROM and independence with ADL's -Progressing     Long Term Goals: 6 weeks  1.Report decreased L shoulder pain  < / =  2 /10  to increase tolerance for functional reaching -Progressing  2.Increase AROM to 60 degrees shoulder ER at 90 -Progressing  3.Increase strength to >/= 4/5 in shoulder abduction to increase tolerance for ADL and work activities. -Progressing  4. Pt goal: to be able move arm in all directions with no pain. -Progressing  5. Pt will have improved gcode of CJ (20-40% limited) on FOTO shoulder in order to demonstrate true functional improvement. -Progressing  6. Pt will return to work full duty, full time. -Progressing       Plan   Plan of care  Certification: 2/17/2023 to 5/12/2023.    Continue to progress per PT POC to return to work    Armando Landry, PT   4/11/2023

## 2023-04-20 ENCOUNTER — CLINICAL SUPPORT (OUTPATIENT)
Dept: REHABILITATION | Facility: HOSPITAL | Age: 48
End: 2023-04-20
Payer: COMMERCIAL

## 2023-04-20 DIAGNOSIS — M12.812 ROTATOR CUFF ARTHROPATHY OF LEFT SHOULDER: Primary | ICD-10-CM

## 2023-04-20 DIAGNOSIS — M25.512 CHRONIC LEFT SHOULDER PAIN: ICD-10-CM

## 2023-04-20 DIAGNOSIS — G89.29 CHRONIC LEFT SHOULDER PAIN: ICD-10-CM

## 2023-04-20 PROCEDURE — 97110 THERAPEUTIC EXERCISES: CPT | Mod: PO

## 2023-04-20 PROCEDURE — 97112 NEUROMUSCULAR REEDUCATION: CPT | Mod: PO

## 2023-04-20 PROCEDURE — 97140 MANUAL THERAPY 1/> REGIONS: CPT | Mod: PO

## 2023-04-20 PROCEDURE — 97530 THERAPEUTIC ACTIVITIES: CPT | Mod: PO

## 2023-04-20 NOTE — PROGRESS NOTES
Workers' [employer] Physical Therapy Daily Treatment Note     Name: Lawrence Devi  Clinic Number: [id number]    Therapy Diagnosis:   Encounter Diagnoses   Name Primary?    Rotator cuff arthropathy of left shoulder Yes    Chronic left shoulder pain        Physician: Delbert Aviles PA-C    Visit Date: 4/20/2023    Physician Orders: PT Eval and Treat   Medical Diagnosis from Referral:   S46.912D (ICD-10-CM) - Strain of left shoulder, subsequent encounter   Y99.0 (ICD-10-CM) - Work related injury      Evaluation Date: 2/17/2023  Authorization Period Expiration: 12/31/23  Plan of Care Expiration: 5/12/2023  Progress Note Due: 4/28/2023 (progress note performed 3/31/2023)  Visit # / Visits authorized: 9/ 12      Time In: 11:45 am  Time Out: 12:30 pm  Total Appointment Time (timed & untimed codes): 45 minutes     Precautions: Standard    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 He's scheduled with an orthopedist consult on 4/26/2023  He was compliant with home exercise program.  Response to previous treatment: mild soreness  Functional change: Improved pain    Pain: 5/10   Location: left shoulder    OBJECTIVE     Observation: PT expressing significant pain during treatment session with sidelying abduction and sidelying flexion. After treatment, PT observed pt open and close truck door with L UE. Pt used both arms to operate steering wheel upon leaving premises.     TREATMENT     Lawrence received the treatments listed below:      Bold activities performed today    therapeutic exercises to develop strength and endurance for 15 minutes including:  Supine serratus punches 1x20 with 3#  2x10 S/L Shoulder ABD  Serratus wall slides with hands in pillow case  "2x15   2x10 S/L Shoulder ER  2X15 Shoulder flexion on wall with towel (finger walking)  Isometric walk outs with YTB EXTERNAL ROTATION, INTERNAL ROTATION, flexion, and abduction    manual therapy techniques: Joint mobilizations and Soft tissue Mobilization were applied to the: left shoulder for 10 minutes, including:  STM to left pectorals, upper trap, Subscapularis,  Inferior/posterior GH joint mobilization    neuromuscular re-education activities to improve: Posture, shoulder stabilization for 10 minutes. The following activities were included:  Shoulder stabilization at 90 in supine with graded therapist resist 3x30"  Wall circles with yellow ball x30 CW/CCW  Rhythmic stabilization with serratus punch hold 3x30"  Scapular retractions 3x10  Body Blade 2X30" Shoulder Neutral ER/IR oscillations    therapeutic activities to improve functional performance for 10 minutes, including:  -UBE 3' forwards, 2' backwards (stopped due to "throbbing")  -Function reaching at wall (stopped due to pain)   - Education on avoiding end range of motion with activities if having increased pain  -Open Books 2x10  -Modified plank at smith machine 2x10 with alternating reaching  -Objective measures gathered as above      Lawrence received cold pack for 00 minutes to to decrease circulation, pain, and swelling.      PATIENT EDUCATION AND HOME EXERCISES      Home Exercises Provided and Patient Education Provided     Education provided:   - HEP  - performed exercises to tolerance and without pain incurease    Written Home Exercises Provided: Patient instructed to cont prior HEP. Exercises were reviewed and Lawrence was able to demonstrate them prior to the end of the session.  Lawrence demonstrated good  understanding of the education provided. See EMR under Patient Instructions for exercises provided during therapy sessions    ASSESSMENT     Lawrence presents to treatment with continued pain and guarding at his L shoulder. Pt continues to have " significant pain with active UE lowering and end range UE elevation on the L arm. Pt to see orthopedist for further evaluation of shoulder. Continue to progress.    The patient's current job specific task deficits include the following: decreased, strength, decreased, decreased AROM, and decreaesed activity tolernace to pulling, pushing, lifting and reaching.     Lawrence Is progressing well towards his goals.   Pt prognosis is Good.     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     Anticipated Barriers for therapy: acuity of current injury     GOALS:   Short Term Goals:  3 weeks  1.Report decreased L Shoulder pain < / =  4/10  to increase tolerance for functional reaching -Progressing   2. Increase AROM in L shoulder ER at 90 to 45 degrees -Progressing  3. Increased strength by 1/3 MMT grade in shoulder abduction to increase tolerance for ADL and work activities. -Progressing  4. Pt to tolerate HEP to improve ROM and independence with ADL's -Progressing     Long Term Goals: 6 weeks  1.Report decreased L shoulder pain  < / =  2 /10  to increase tolerance for functional reaching -Progressing  2.Increase AROM to 60 degrees shoulder ER at 90 -Progressing  3.Increase strength to >/= 4/5 in shoulder abduction to increase tolerance for ADL and work activities. -Progressing  4. Pt goal: to be able move arm in all directions with no pain. -Progressing  5. Pt will have improved gcode of CJ (20-40% limited) on FOTO shoulder in order to demonstrate true functional improvement. -Progressing  6. Pt will return to work full duty, full time. -Progressing       Plan   Plan of care Certification: 2/17/2023 to 5/12/2023.    Continue to progress per PT POC to return to  work    Armando Landry, PT   4/20/2023

## 2024-07-29 ENCOUNTER — LAB VISIT (OUTPATIENT)
Dept: LAB | Facility: HOSPITAL | Age: 49
End: 2024-07-29
Attending: UROLOGY
Payer: COMMERCIAL

## 2024-07-29 ENCOUNTER — OFFICE VISIT (OUTPATIENT)
Dept: UROLOGY | Facility: CLINIC | Age: 49
End: 2024-07-29
Payer: COMMERCIAL

## 2024-07-29 VITALS
SYSTOLIC BLOOD PRESSURE: 132 MMHG | BODY MASS INDEX: 29.32 KG/M2 | WEIGHT: 222.25 LBS | HEART RATE: 84 BPM | DIASTOLIC BLOOD PRESSURE: 91 MMHG

## 2024-07-29 DIAGNOSIS — N40.1 BPH WITH URINARY OBSTRUCTION: ICD-10-CM

## 2024-07-29 DIAGNOSIS — N13.8 BPH WITH URINARY OBSTRUCTION: ICD-10-CM

## 2024-07-29 DIAGNOSIS — N52.9 ED (ERECTILE DYSFUNCTION) OF ORGANIC ORIGIN: Primary | ICD-10-CM

## 2024-07-29 LAB — COMPLEXED PSA SERPL-MCNC: 2.1 NG/ML (ref 0–4)

## 2024-07-29 PROCEDURE — 36415 COLL VENOUS BLD VENIPUNCTURE: CPT | Performed by: UROLOGY

## 2024-07-29 PROCEDURE — 99999 PR PBB SHADOW E&M-EST. PATIENT-LVL III: CPT | Mod: PBBFAC,,, | Performed by: UROLOGY

## 2024-07-29 PROCEDURE — 3080F DIAST BP >= 90 MM HG: CPT | Mod: CPTII,S$GLB,, | Performed by: UROLOGY

## 2024-07-29 PROCEDURE — 3008F BODY MASS INDEX DOCD: CPT | Mod: CPTII,S$GLB,, | Performed by: UROLOGY

## 2024-07-29 PROCEDURE — 3075F SYST BP GE 130 - 139MM HG: CPT | Mod: CPTII,S$GLB,, | Performed by: UROLOGY

## 2024-07-29 PROCEDURE — 84153 ASSAY OF PSA TOTAL: CPT | Performed by: UROLOGY

## 2024-07-29 PROCEDURE — 99205 OFFICE O/P NEW HI 60 MIN: CPT | Mod: S$GLB,,, | Performed by: UROLOGY

## 2024-07-29 PROCEDURE — 1159F MED LIST DOCD IN RCRD: CPT | Mod: CPTII,S$GLB,, | Performed by: UROLOGY

## 2024-07-29 RX ORDER — TADALAFIL 5 MG/1
5 TABLET ORAL DAILY PRN
COMMUNITY
End: 2024-07-29 | Stop reason: SDUPTHER

## 2024-07-29 RX ORDER — DOXYCYCLINE HYCLATE 100 MG
100 TABLET ORAL ONCE
OUTPATIENT
Start: 2024-07-29 | End: 2024-07-29

## 2024-07-29 RX ORDER — TAMSULOSIN HYDROCHLORIDE 0.4 MG/1
1 CAPSULE ORAL NIGHTLY
COMMUNITY
Start: 2024-04-23 | End: 2025-04-23

## 2024-07-29 RX ORDER — TADALAFIL 5 MG/1
5 TABLET ORAL DAILY PRN
Qty: 30 TABLET | Refills: 11 | Status: SHIPPED | OUTPATIENT
Start: 2024-07-29

## 2024-07-29 RX ORDER — HYDROCHLOROTHIAZIDE 12.5 MG/1
12.5 CAPSULE ORAL DAILY
COMMUNITY
Start: 2024-07-10

## 2024-07-29 RX ORDER — LIDOCAINE HYDROCHLORIDE 20 MG/ML
JELLY TOPICAL ONCE
OUTPATIENT
Start: 2024-07-29 | End: 2024-07-29

## 2024-07-29 RX ORDER — TADALAFIL 5 MG/1
5 TABLET ORAL DAILY PRN
Qty: 30 TABLET | Refills: 11 | Status: SHIPPED | OUTPATIENT
Start: 2024-07-29 | End: 2024-07-29
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
## 2024-07-29 NOTE — TELEPHONE ENCOUNTER
[facility] states cialis has 2 directions, I edited the RX to state one direction , can you please resend
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
## 2024-07-29 NOTE — TELEPHONE ENCOUNTER
----- Message from Refugio Fields sent at 7/29/2024  3:25 PM CDT -----  Regarding: Medicatiion Clarification  Contact: Keenan/[facility] [phone]  Keenan called from [facility] regarding pt medication tadalafiL (CIALIS) 5 MG tablet that needs clarification. Please call pharmacy.    [facility] [address] [place], [place] - [address]  [address] [facility] [address]  Phone: [phone] Fax: [phone]

## 2024-08-19 ENCOUNTER — PATIENT MESSAGE (OUTPATIENT)
Dept: UROLOGY | Facility: CLINIC | Age: 49
End: 2024-08-19
Payer: COMMERCIAL

## 2024-08-20 ENCOUNTER — LAB VISIT (OUTPATIENT)
Dept: LAB | Facility: HOSPITAL | Age: 49
End: 2024-08-20
Attending: UROLOGY
Payer: COMMERCIAL

## 2024-08-20 ENCOUNTER — PROCEDURE VISIT (OUTPATIENT)
Dept: UROLOGY | Facility: CLINIC | Age: 49
End: 2024-08-20
Payer: COMMERCIAL

## 2024-08-20 ENCOUNTER — TELEPHONE (OUTPATIENT)
Dept: UROLOGY | Facility: CLINIC | Age: 49
End: 2024-08-20
Payer: COMMERCIAL

## 2024-08-20 VITALS
SYSTOLIC BLOOD PRESSURE: 140 MMHG | TEMPERATURE: 98 F | WEIGHT: 221 LBS | BODY MASS INDEX: 29.29 KG/M2 | DIASTOLIC BLOOD PRESSURE: 89 MMHG | HEIGHT: 73 IN | HEART RATE: 72 BPM

## 2024-08-20 DIAGNOSIS — N50.89 GENITAL LESION, MALE: ICD-10-CM

## 2024-08-20 DIAGNOSIS — N13.8 BPH WITH URINARY OBSTRUCTION: Primary | ICD-10-CM

## 2024-08-20 DIAGNOSIS — N13.8 BPH WITH URINARY OBSTRUCTION: ICD-10-CM

## 2024-08-20 DIAGNOSIS — N52.9 ED (ERECTILE DYSFUNCTION) OF ORGANIC ORIGIN: Primary | ICD-10-CM

## 2024-08-20 DIAGNOSIS — N40.1 BPH WITH URINARY OBSTRUCTION: ICD-10-CM

## 2024-08-20 DIAGNOSIS — N40.1 BPH WITH URINARY OBSTRUCTION: Primary | ICD-10-CM

## 2024-08-20 LAB
ALBUMIN SERPL BCP-MCNC: 3.5 G/DL (ref 3.5–5.2)
ALP SERPL-CCNC: 62 U/L (ref 55–135)
ALT SERPL W/O P-5'-P-CCNC: 14 U/L (ref 10–44)
ANION GAP SERPL CALC-SCNC: 8 MMOL/L (ref 8–16)
AST SERPL-CCNC: 17 U/L (ref 10–40)
BASOPHILS # BLD AUTO: 0.06 K/UL (ref 0–0.2)
BASOPHILS NFR BLD: 0.6 % (ref 0–1.9)
BILIRUB SERPL-MCNC: 0.3 MG/DL (ref 0.1–1)
BUN SERPL-MCNC: 10 MG/DL (ref 6–20)
CALCIUM SERPL-MCNC: 9.1 MG/DL (ref 8.7–10.5)
CHLORIDE SERPL-SCNC: 106 MMOL/L (ref 95–110)
CO2 SERPL-SCNC: 28 MMOL/L (ref 23–29)
CREAT SERPL-MCNC: 0.9 MG/DL (ref 0.5–1.4)
DIFFERENTIAL METHOD BLD: ABNORMAL
EOSINOPHIL # BLD AUTO: 0.2 K/UL (ref 0–0.5)
EOSINOPHIL NFR BLD: 1.6 % (ref 0–8)
ERYTHROCYTE [DISTWIDTH] IN BLOOD BY AUTOMATED COUNT: 15.1 % (ref 11.5–14.5)
EST. GFR  (NO RACE VARIABLE): >60 ML/MIN/1.73 M^2
GLUCOSE SERPL-MCNC: 86 MG/DL (ref 70–110)
HCT VFR BLD AUTO: 41.5 % (ref 40–54)
HGB BLD-MCNC: 13.8 G/DL (ref 14–18)
IMM GRANULOCYTES # BLD AUTO: 0.03 K/UL (ref 0–0.04)
IMM GRANULOCYTES NFR BLD AUTO: 0.3 % (ref 0–0.5)
LYMPHOCYTES # BLD AUTO: 2.8 K/UL (ref 1–4.8)
LYMPHOCYTES NFR BLD: 27.8 % (ref 18–48)
MCH RBC QN AUTO: 29.7 PG (ref 27–31)
MCHC RBC AUTO-ENTMCNC: 33.3 G/DL (ref 32–36)
MCV RBC AUTO: 89 FL (ref 82–98)
MONOCYTES # BLD AUTO: 0.7 K/UL (ref 0.3–1)
MONOCYTES NFR BLD: 7.4 % (ref 4–15)
NEUTROPHILS # BLD AUTO: 6.2 K/UL (ref 1.8–7.7)
NEUTROPHILS NFR BLD: 62.3 % (ref 38–73)
NRBC BLD-RTO: 0 /100 WBC
PLATELET # BLD AUTO: 355 K/UL (ref 150–450)
PMV BLD AUTO: 10.2 FL (ref 9.2–12.9)
POTASSIUM SERPL-SCNC: 3.9 MMOL/L (ref 3.5–5.1)
PROT SERPL-MCNC: 6.3 G/DL (ref 6–8.4)
RBC # BLD AUTO: 4.65 M/UL (ref 4.6–6.2)
SODIUM SERPL-SCNC: 142 MMOL/L (ref 136–145)
WBC # BLD AUTO: 9.88 K/UL (ref 3.9–12.7)

## 2024-08-20 PROCEDURE — 36415 COLL VENOUS BLD VENIPUNCTURE: CPT | Performed by: UROLOGY

## 2024-08-20 PROCEDURE — 86695 HERPES SIMPLEX TYPE 1 TEST: CPT | Performed by: UROLOGY

## 2024-08-20 PROCEDURE — 85025 COMPLETE CBC W/AUTO DIFF WBC: CPT | Performed by: UROLOGY

## 2024-08-20 PROCEDURE — 86696 HERPES SIMPLEX TYPE 2 TEST: CPT | Performed by: UROLOGY

## 2024-08-20 PROCEDURE — 80053 COMPREHEN METABOLIC PANEL: CPT | Performed by: UROLOGY

## 2024-08-20 RX ORDER — LIDOCAINE HYDROCHLORIDE 20 MG/ML
JELLY TOPICAL ONCE
Status: COMPLETED | OUTPATIENT
Start: 2024-08-20 | End: 2024-08-20

## 2024-08-20 RX ORDER — DOXYCYCLINE HYCLATE 100 MG
100 TABLET ORAL ONCE
Status: COMPLETED | OUTPATIENT
Start: 2024-08-20 | End: 2024-08-20

## 2024-08-20 RX ORDER — TADALAFIL 20 MG/1
20 TABLET ORAL
Qty: 30 TABLET | Refills: 5 | Status: SHIPPED | OUTPATIENT
Start: 2024-08-20 | End: 2025-08-20

## 2024-08-20 RX ADMIN — Medication 100 MG: at 03:08

## 2024-08-20 RX ADMIN — LIDOCAINE HYDROCHLORIDE: 20 JELLY TOPICAL at 03:08

## 2024-08-20 NOTE — PROCEDURES
Cystoscopy    Date/Time: 8/20/2024 2:15 PM    Performed by: Maxim Harris MD  Authorized by: Maxim Harris MD    Comments:      Procedure Date:  08/20/2024      Procedure:  Male Diagnostic Cystourethroscopy    Pre-op diagnosis: BPH with obstruction, OAB  Post-op diagnosis: same  Anesthesia: Local  Surgeon:  Maxim Harris MD    Findings:  Urethra:  Normal urethra.   Sphincter: competent.  Prostate: Estimated Length Prostatic Urethra: 5 cm with trilobar obstruction, mild intravesical lobe obstruction  Bladder neck: patent with no stricture  Bladder:  Normal bladder.  2 + trabeculation with tic at the dome.  No tumor or stone seen.  Normal ureteral orifices bilaterally.   Moderate trabeculation with early sacculation.     Description of Procedure:                                                         Informed Consent:                                                            - Risks, benefits and alternatives of procedure discussed with               patient and informed consent obtained.       Patient Position:   - Supine. --- Bladder ---   Prep and Drape:   - Patient prepped and draped in usual sterile fashion using povidone     iodine (Betadine).   Instruments:   - 16 Fr flexible cystoscope with 0 degree lens.   Procedure Details:   - Cystoscope passed under vision into bladder.   - Bladder and urethra examined in their entirety with findings as     above.     Conclusion:  1. BPH with obstruction  2. OAB with bladder spasm    Failed medical treatment.  nature of BPH with obstruction discussed in depth.  discussed extensively regarding options of treatments including medications ( alpha blockers, 5 alpha reductase inhibitors, and daily cialis), minimally invasive therapy (Rezum Therapy), and surgery (laser vs. Traditional TURP).  discussed benefits and risks of these treatments.    Pt is interested in Rezum Therapy.  Would like to get it done before the end of August due to his work.    3. Left scrotal lesion.     Pt might be exposed for herpes from his girl friend.  Will get herpes test       Plan:  Patient was discharged home in a stable condition.  Medications: doxy  Follow up:  Rezum Therapy    · ED (erectile dysfunction) of organic origin   tadalafiL (CIALIS) 20 MG Tab; Take 1 tablet (20 mg total) by mouth as needed.  Dispense: 30 tablet; Refill: 5    · BPH with urinary obstruction   Cystoscopy   CBC Auto Differential; Future; Expected date: 08/20/2024   Comprehensive Metabolic Panel; Future; Expected date: 08/20/2024   EKG 12-lead; Future; Expected date: 08/20/2024    · Genital lesion, male   HERPES SIMPLEX 1&2 IGG; Future; Expected date: 08/20/2024    · Other orders   LIDOcaine HCl 2% urojet   doxycycline tablet 100 mg

## 2024-08-20 NOTE — TELEPHONE ENCOUNTER
BPH with urinary obstruction  -     Case Request Operating Room: DESTRUCTION, PROSTATE, TRANSURETHRAL

## 2024-08-20 NOTE — PATIENT INSTRUCTIONS
_                                                                                                                                                                                             If any problems after hours or weekends, you may call [phone] and ask for the urology resident on call.CYSTOSCOPY  UROLOGY CLINIC DISCHARGE INSTRUCTIONS    You have had a procedure that will require time to properly heal. Follow the instructions you have been given on how to care for yourself once you are home. Below is additional information to help in your recovery.    ACTIVITY  There are no restrictions in activity. Start doing again the things you did before the procedure.  You may experience a slight burning sensation. You may notice a small amount of blood in your urine. This will clear up within a day. Call the clinic if this continues beyond 48 hours.    DIET  Continue your normal diet. You may eat the same foods you ate before your procedure.  Drink plenty of fluids during the first 24-48 hours following your procedure.    MEDICATIONS  Resume all other previous medications from your prescribing physician.  Continue any pre=procedure antibiotics until they are all gone.    SIGNS AND SYMPTOMS TO REPORT TO THE DOCTOR  Chills or fever greater than 101° F within 24 hours of procedure.  Changes in urination, such as increased bleeding, foul smell, cloudy urine, or painful urination.  Call your doctor with any questions or concerns.    For any emergency situation, call 911 immediately or go to your nearest emergency room.    [facility]  [phone]    Rezum water vapor therapy of the prostate:    1.  My surgery scheduler Jessica at [phone] will call you to schedule the procedure.   2.  Stop taking Aspirin and other aspirin products 1 week before and/or Plavix or Coumadin 5 days before the procedure.   3.  will give you IV abx at the time of the procedure and then oral abx to follow.  4.  NPO after  midnight for the procedure because of anesthesia  5.  Have someone to give you a ride back and forth for the procedure.  6.  Yoo catheter can be moved by you in 5 to 7 days depending on your instruction given by the physician.  7.  Continue your prostate medication as before until your post-op appointment in 6 wks.  8.  The complete resolution of symptoms may take up to 3 months.  9.  Typical post-op medication include: cipro( antibiotics), pyridium ( to minimize burning on urination), for 1 wk, ibuprofen for 2 wks.

## 2024-08-21 ENCOUNTER — TELEPHONE (OUTPATIENT)
Dept: UROLOGY | Facility: CLINIC | Age: 49
End: 2024-08-21
Payer: COMMERCIAL

## 2024-08-21 ENCOUNTER — HOSPITAL ENCOUNTER (OUTPATIENT)
Dept: CARDIOLOGY | Facility: CLINIC | Age: 49
Discharge: HOME | End: 2024-08-21
Payer: COMMERCIAL

## 2024-08-21 DIAGNOSIS — N13.8 BPH WITH URINARY OBSTRUCTION: ICD-10-CM

## 2024-08-21 DIAGNOSIS — A60.00 HERPES SIMPLEX INFECTION OF GENITOURINARY SYSTEM: Primary | ICD-10-CM

## 2024-08-21 DIAGNOSIS — N40.1 BPH WITH URINARY OBSTRUCTION: ICD-10-CM

## 2024-08-21 LAB
HSV1 IGG SERPL QL IA: POSITIVE
HSV2 IGG SERPL QL IA: POSITIVE
OHS QRS DURATION: 102 MS
OHS QTC CALCULATION: 435 MS

## 2024-08-21 PROCEDURE — 93010 ELECTROCARDIOGRAM REPORT: CPT | Mod: S$GLB,,, | Performed by: INTERNAL MEDICINE

## 2024-08-21 PROCEDURE — 93005 ELECTROCARDIOGRAM TRACING: CPT | Mod: S$GLB,,, | Performed by: UROLOGY

## 2024-08-21 NOTE — TELEPHONE ENCOUNTER
Herpes test came back positive.  I will refer him to ID.  His partner needs to be examed and  treated.    Herpes simplex infection of genitourinary system  -     Ambulatory referral/consult to Infectious Disease; Future; Expected date: 08/28/2024

## 2024-08-22 ENCOUNTER — OFFICE VISIT (OUTPATIENT)
Dept: INFECTIOUS DISEASES | Facility: CLINIC | Age: 49
End: 2024-08-22
Payer: COMMERCIAL

## 2024-08-22 ENCOUNTER — LAB VISIT (OUTPATIENT)
Dept: LAB | Facility: HOSPITAL | Age: 49
End: 2024-08-22
Attending: INTERNAL MEDICINE
Payer: COMMERCIAL

## 2024-08-22 VITALS
DIASTOLIC BLOOD PRESSURE: 86 MMHG | HEART RATE: 69 BPM | TEMPERATURE: 99 F | SYSTOLIC BLOOD PRESSURE: 135 MMHG | HEIGHT: 73 IN | WEIGHT: 227.31 LBS | BODY MASS INDEX: 30.13 KG/M2

## 2024-08-22 DIAGNOSIS — A60.00 HERPES SIMPLEX INFECTION OF GENITOURINARY SYSTEM: ICD-10-CM

## 2024-08-22 LAB — TREPONEMA PALLIDUM IGG+IGM AB [PRESENCE] IN SERUM OR PLASMA BY IMMUNOASSAY: NONREACTIVE

## 2024-08-22 PROCEDURE — 86593 SYPHILIS TEST NON-TREP QUANT: CPT | Performed by: INTERNAL MEDICINE

## 2024-08-22 PROCEDURE — 36415 COLL VENOUS BLD VENIPUNCTURE: CPT | Performed by: INTERNAL MEDICINE

## 2024-08-22 PROCEDURE — 99999 PR PBB SHADOW E&M-EST. PATIENT-LVL IV: CPT | Mod: PBBFAC,,, | Performed by: INTERNAL MEDICINE

## 2024-08-22 RX ORDER — VALACYCLOVIR HYDROCHLORIDE 1 G/1
1000 TABLET, FILM COATED ORAL 2 TIMES DAILY
Qty: 30 TABLET | Refills: 0 | Status: SHIPPED | OUTPATIENT
Start: 2024-08-22

## 2024-08-22 RX ORDER — SILDENAFIL 100 MG/1
100 TABLET, FILM COATED ORAL
COMMUNITY
Start: 2024-08-13 | End: 2024-09-12

## 2024-08-22 RX ORDER — HYDROCODONE BITARTRATE AND ACETAMINOPHEN 5; 325 MG/1; MG/1
1 TABLET ORAL EVERY 6 HOURS PRN
COMMUNITY
Start: 2024-08-13

## 2024-08-22 RX ORDER — SYRINGE W-NEEDLE,DISPOSAB,3 ML 25GX5/8"
1 SYRINGE, EMPTY DISPOSABLE MISCELLANEOUS
COMMUNITY
Start: 2024-08-13

## 2024-08-22 RX ORDER — NEEDLES, DISPOSABLE 22GX1 1/2"
NEEDLE, DISPOSABLE MISCELLANEOUS
COMMUNITY
Start: 2024-04-23

## 2024-08-22 RX ORDER — TESTOSTERONE CYPIONATE 200 MG/ML
200 INJECTION, SOLUTION INTRAMUSCULAR
COMMUNITY
Start: 2024-08-13

## 2024-08-22 RX ORDER — OMEPRAZOLE 40 MG/1
1 CAPSULE, DELAYED RELEASE ORAL DAILY
COMMUNITY
Start: 2024-08-13 | End: 2025-08-13

## 2024-08-22 RX ORDER — ROSUVASTATIN CALCIUM 10 MG/1
1 TABLET, COATED ORAL DAILY
COMMUNITY
Start: 2024-08-13
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
## 2024-08-26 NOTE — PRE-PROCEDURE INSTRUCTIONS
PreOp Instructions given:    -- Medication information (what to hold and what to take)   -- NPO guidelines as follows: (or as per your Surgeon)  Stop ALL solid food, gum, candy 8 hours before arrival time.  Stop all CLOUDY liquids: coffee with creamer, cloudy juices, 8 hours prior to arrival time.  The patient should be ENCOURAGED to drink carbohydrate-rich clear liquids (sports drinks, clear juices) until 2 hours prior to arrival time.  Stop clear liquids 2 hours prior to arrival time.  CLEAR liquids include water, black coffee NO creamer, clear oral rehydration drinks, clear sports drinks and clear fruit juices (no orange juice, no pulpy juices, no apple cider).   IF IN DOUBT, drink water instead.   NOTHING TO DRINK 2 hours before to surgery/procedure  time. If you are told to take medication on the morning of surgery, it may be taken with a sip of water.   -- *Arrival place and directions given*.  Time to be given the day before procedure by the Surgeon's Office   -- Bathe with antibacterial soap (dial or Hibiclens as instructed)  -- Don't wear any jewelry or valuables and not metals on skin or hair AM of surgery   -- No makeup or moisturizer to face   -- No perfume/cologne, powder, lotions, aftershave or deodorant    Pt verbalized understanding.         *If going to , see below:     Directions and Instructions for [facility]   At [facility], we have an outstanding team of physicians, anesthesiologists, CRNAs, Registered Nurses, Surgical Technologists, and other ancillary team members all focused on your surgical and procedural care.   Before Your Procedure:   The physician's office will call you with a specific arrival time and directions a day or two before your scheduled procedure. You may also receive these instructions through your [facility] portal.   Day of Procedure:   Please be sure to arrive at the arrival time given or you may risk your surgery being delayed or  canceled. The arrival time is earlier than your scheduled surgery or procedure time. In the winter months please dress warm and bring blankets for you or your child as the waiting room may be cold. If you have difficulty locating the facility, please give us a call at [phone].   Directions:   The [facility] is located on the 1st floor of the hospital building near the [place] entrance.   Parking:   You will park in the [facility] Parking Garage (note location on map). [facility] opens at 5:00 a.m. and has a drop off area by the entrance.  parking is available starting at 7:00 a.m. Please see below for further  parking instructions.   Directions from the parking garage elevators   Blue [facility] Elevators: From the parking garage, take the blue Chavira [employer] elevators (located in the center of the parking garage) to the 1st floor of the garage. You will then take a right once off the elevators then another right to the outside of the parking garage. You will be across from the [facility]. You will walk down the sidewalk, pass the  curve at the [place] entrance and continue to follow the sidewalk. You will pass the radiation oncology entrance on your right. Continue to follow the sidewalk to the [facility] glass door entrance.   Hospital Entrance (Inside Route): If a mostly inside route is preferred: Take the inside elevator bank (located at the far north end of the garage) from the parking garage to the 1st floor. On the 1st floor walk past [employer]. Keep walking down the center of the hallway towards the hospital elevators. Once you reach the red brick yulisa, take a left and go past the hospital elevators. Take another left and follow the blue and white Chavira Stevens signs around the hallway to the end. Go outside of the door. You will see the [facility] entrance to your right.   Drop Off:   There is a drop off area at  the doors of the [facility] for your convenience. If utilized for pediatric patients, an adult must accompany the patient into the surgery center while another adult gregory the vehicle.   [place] (at 7:00 a.m.):   Upon check-in, please let the  know that you are utilizing [employer] parking which is free. The . will then call [employer] for your car to be picked up. Your keys and phone number will be collected and given to [employer] services. You will then be given a ticket. Upon discharge, [employer] will be notified to bring your vehicle back when you are ready.   2/6/2024      If going to 2nd floor surgery center, see below:    Directions to the 2nd floor ([facility]) Surgery Center  The hallway to get to the surgery center is on the 2nd fl between the gold elevators in the atrium.  Follow the hallway into the waiting room (has a fish tank) and check in at desk.

## 2024-08-27 ENCOUNTER — TELEPHONE (OUTPATIENT)
Dept: UROLOGY | Facility: CLINIC | Age: 49
End: 2024-08-27
Payer: COMMERCIAL

## 2024-08-27 NOTE — TELEPHONE ENCOUNTER
Good Morning. Your surgery will start at 10:00 am, but your arrival time is 8:00 am.   It will be on the [facility] Passed the gold elevators.  You will need for someone to drive you home because you will be under anaesthesia.  No eating or drinking after Midnight. Take a shower the night before and the morning of with a anti-bacterial soap, ( Dial Soap/ Hibiclens) Do not apply and deodorant, perfume, powder or body lotions the morning of surgery. Wear comfortable clothing, like loose fitting pants and a button down shirt. Leave all jewelry and valuables at home.   If you have any questions don't hesitate to call me.

## 2024-08-28 ENCOUNTER — ANESTHESIA EVENT (OUTPATIENT)
Dept: SURGERY | Facility: HOSPITAL | Age: 49
End: 2024-08-28
Payer: COMMERCIAL

## 2024-08-28 ENCOUNTER — HOSPITAL ENCOUNTER (OUTPATIENT)
Facility: HOSPITAL | Age: 49
Discharge: HOME | End: 2024-08-28
Attending: UROLOGY | Admitting: UROLOGY
Payer: COMMERCIAL

## 2024-08-28 ENCOUNTER — ANESTHESIA (OUTPATIENT)
Dept: SURGERY | Facility: HOSPITAL | Age: 49
End: 2024-08-28
Payer: COMMERCIAL

## 2024-08-28 VITALS
OXYGEN SATURATION: 99 % | RESPIRATION RATE: 60 BRPM | TEMPERATURE: 97 F | HEART RATE: 65 BPM | SYSTOLIC BLOOD PRESSURE: 140 MMHG | DIASTOLIC BLOOD PRESSURE: 90 MMHG

## 2024-08-28 DIAGNOSIS — N40.0 BPH (BENIGN PROSTATIC HYPERPLASIA): ICD-10-CM

## 2024-08-28 PROCEDURE — 36000707: Performed by: UROLOGY

## 2024-08-28 PROCEDURE — 37000009 HC ANESTHESIA EA ADD 15 MINS: Performed by: UROLOGY

## 2024-08-28 PROCEDURE — 25000003 PHARM REV CODE 250

## 2024-08-28 PROCEDURE — 27201423 OPTIME MED/SURG SUP & DEVICES STERILE SUPPLY: Performed by: UROLOGY

## 2024-08-28 PROCEDURE — 25000003 PHARM REV CODE 250: Performed by: NURSE ANESTHETIST, CERTIFIED REGISTERED

## 2024-08-28 PROCEDURE — 25000003 PHARM REV CODE 250: Performed by: UROLOGY

## 2024-08-28 PROCEDURE — 63600175 PHARM REV CODE 636 W HCPCS

## 2024-08-28 PROCEDURE — 71000016 HC POSTOP RECOV ADDL HR: Performed by: UROLOGY

## 2024-08-28 PROCEDURE — 63600175 PHARM REV CODE 636 W HCPCS: Performed by: NURSE ANESTHETIST, CERTIFIED REGISTERED

## 2024-08-28 PROCEDURE — 36000706: Performed by: UROLOGY

## 2024-08-28 PROCEDURE — 71000015 HC POSTOP RECOV 1ST HR: Performed by: UROLOGY

## 2024-08-28 PROCEDURE — 53854 TRURL DSTRJ PRST8 TISS RF WV: CPT | Mod: ,,, | Performed by: UROLOGY

## 2024-08-28 PROCEDURE — 71000044 HC DOSC ROUTINE RECOVERY FIRST HOUR: Performed by: UROLOGY

## 2024-08-28 PROCEDURE — 37000008 HC ANESTHESIA 1ST 15 MINUTES: Performed by: UROLOGY

## 2024-08-28 RX ORDER — GLUCAGON 1 MG
1 KIT INJECTION
Status: DISCONTINUED | OUTPATIENT
Start: 2024-08-28 | End: 2024-08-28 | Stop reason: HOSPADM

## 2024-08-28 RX ORDER — MIDAZOLAM HYDROCHLORIDE 5 MG/ML
INJECTION INTRAMUSCULAR; INTRAVENOUS
Status: DISCONTINUED | OUTPATIENT
Start: 2024-08-28 | End: 2024-08-28

## 2024-08-28 RX ORDER — LIDOCAINE HYDROCHLORIDE 20 MG/ML
JELLY TOPICAL
Status: DISCONTINUED | OUTPATIENT
Start: 2024-08-28 | End: 2024-08-28 | Stop reason: HOSPADM

## 2024-08-28 RX ORDER — ONDANSETRON HYDROCHLORIDE 2 MG/ML
INJECTION, SOLUTION INTRAVENOUS
Status: DISCONTINUED | OUTPATIENT
Start: 2024-08-28 | End: 2024-08-28

## 2024-08-28 RX ORDER — CIPROFLOXACIN 500 MG/1
500 TABLET ORAL EVERY 12 HOURS
Qty: 20 TABLET | Refills: 0 | Status: SHIPPED | OUTPATIENT
Start: 2024-08-28 | End: 2024-09-07

## 2024-08-28 RX ORDER — DEXAMETHASONE SODIUM PHOSPHATE 4 MG/ML
INJECTION, SOLUTION INTRA-ARTICULAR; INTRALESIONAL; INTRAMUSCULAR; INTRAVENOUS; SOFT TISSUE
Status: DISCONTINUED | OUTPATIENT
Start: 2024-08-28 | End: 2024-08-28

## 2024-08-28 RX ORDER — PROPOFOL 10 MG/ML
VIAL (ML) INTRAVENOUS
Status: DISCONTINUED | OUTPATIENT
Start: 2024-08-28 | End: 2024-08-28

## 2024-08-28 RX ORDER — LIDOCAINE HYDROCHLORIDE 20 MG/ML
INJECTION INTRAVENOUS
Status: DISCONTINUED | OUTPATIENT
Start: 2024-08-28 | End: 2024-08-28

## 2024-08-28 RX ORDER — PHENAZOPYRIDINE HYDROCHLORIDE 100 MG/1
100 TABLET, FILM COATED ORAL 3 TIMES DAILY PRN
Qty: 30 TABLET | Refills: 0 | Status: SHIPPED | OUTPATIENT
Start: 2024-08-28 | End: 2024-09-07

## 2024-08-28 RX ORDER — FENTANYL CITRATE 50 UG/ML
INJECTION, SOLUTION INTRAMUSCULAR; INTRAVENOUS
Status: DISCONTINUED | OUTPATIENT
Start: 2024-08-28 | End: 2024-08-28

## 2024-08-28 RX ORDER — OXYBUTYNIN CHLORIDE 5 MG/1
5 TABLET ORAL 3 TIMES DAILY PRN
Qty: 30 TABLET | Refills: 0 | Status: SHIPPED | OUTPATIENT
Start: 2024-08-28 | End: 2024-09-17

## 2024-08-28 RX ORDER — ROCURONIUM BROMIDE 10 MG/ML
INJECTION, SOLUTION INTRAVENOUS
Status: DISCONTINUED | OUTPATIENT
Start: 2024-08-28 | End: 2024-08-28

## 2024-08-28 RX ORDER — HYDROMORPHONE HYDROCHLORIDE 1 MG/ML
0.2 INJECTION, SOLUTION INTRAMUSCULAR; INTRAVENOUS; SUBCUTANEOUS EVERY 5 MIN PRN
Status: DISCONTINUED | OUTPATIENT
Start: 2024-08-28 | End: 2024-08-28 | Stop reason: HOSPADM

## 2024-08-28 RX ORDER — HALOPERIDOL 5 MG/ML
0.5 INJECTION INTRAMUSCULAR EVERY 10 MIN PRN
Status: DISCONTINUED | OUTPATIENT
Start: 2024-08-28 | End: 2024-08-28 | Stop reason: HOSPADM

## 2024-08-28 RX ORDER — SODIUM CHLORIDE 0.9 % (FLUSH) 0.9 %
3 SYRINGE (ML) INJECTION
Status: DISCONTINUED | OUTPATIENT
Start: 2024-08-28 | End: 2024-08-28 | Stop reason: HOSPADM

## 2024-08-28 RX ORDER — IBUPROFEN 800 MG/1
800 TABLET ORAL EVERY 6 HOURS PRN
Qty: 10 TABLET | Refills: 0 | Status: SHIPPED | OUTPATIENT
Start: 2024-08-28

## 2024-08-28 RX ADMIN — DEXAMETHASONE SODIUM PHOSPHATE 4 MG: 4 INJECTION, SOLUTION INTRAMUSCULAR; INTRAVENOUS at 10:08

## 2024-08-28 RX ADMIN — ROCURONIUM BROMIDE 40 MG: 10 INJECTION, SOLUTION INTRAVENOUS at 10:08

## 2024-08-28 RX ADMIN — SUGAMMADEX 200 MG: 100 INJECTION, SOLUTION INTRAVENOUS at 10:08

## 2024-08-28 RX ADMIN — ONDANSETRON 4 MG: 2 INJECTION INTRAMUSCULAR; INTRAVENOUS at 10:08

## 2024-08-28 RX ADMIN — FENTANYL CITRATE 50 MCG: 50 INJECTION, SOLUTION INTRAMUSCULAR; INTRAVENOUS at 10:08

## 2024-08-28 RX ADMIN — MIDAZOLAM HYDROCHLORIDE 2 MG: 5 INJECTION, SOLUTION INTRAMUSCULAR; INTRAVENOUS at 10:08

## 2024-08-28 RX ADMIN — ROCURONIUM BROMIDE 10 MG: 10 INJECTION, SOLUTION INTRAVENOUS at 10:08

## 2024-08-28 RX ADMIN — LIDOCAINE HYDROCHLORIDE 100 MG: 20 INJECTION INTRAVENOUS at 10:08

## 2024-08-28 RX ADMIN — PROPOFOL 200 MG: 10 INJECTION, EMULSION INTRAVENOUS at 10:08

## 2024-08-28 RX ADMIN — CEFAZOLIN 2 G: 2 INJECTION, POWDER, FOR SOLUTION INTRAMUSCULAR; INTRAVENOUS at 10:08

## 2024-08-28 NOTE — TRANSFER OF CARE
Anesthesia Transfer of Care Note    Patient: Lawrence Devi    Procedure(s) Performed: Procedure(s) (LRB):  DESTRUCTION, PROSTATE, TRANSURETHRAL (N/A)    Patient location: PACU    Anesthesia Type: general    Transport from OR: Transported from OR on 6-10 L/min O2 by face mask with adequate spontaneous ventilation    Post pain: adequate analgesia    Post assessment: no apparent anesthetic complications    Post vital signs: stable    Level of consciousness: sedated    Nausea/Vomiting: no nausea/vomiting    Complications: none    Transfer of care protocol was followed    Last vitals: Visit Vitals  /88

## 2024-08-28 NOTE — ANESTHESIA PROCEDURE NOTES
Intubation    Date/Time: 8/28/2024 10:33 AM    Performed by: Kika Jones CRNA  Authorized by: Gagandeep Henry MD    Intubation:     Induction:  Intravenous    Intubated:  Postinduction    Mask Ventilation:  Easy mask    Attempts:  1    Attempted By:  Student    Method of Intubation:  Direct    Blade:  Bailee 3    Laryngeal View Grade: Grade IIb - only the arytenoids and epiglottis seen      Difficult Airway Encountered?: No      Complications:  None    Airway Device:  Oral endotracheal tube    Airway Device Size:  7.5    Style/Cuff Inflation:  Cuffed (inflated to minimal occlusive pressure)    Tube secured:  23    Secured at:  The lips    Placement Verified By:  Capnometry    Complicating Factors:  None    Findings Post-Intubation:  BS equal bilateral and atraumatic/condition of teeth unchanged

## 2024-08-28 NOTE — DISCHARGE INSTRUCTIONS
Rezum Prostate Water Vapor Therapy   Postoperative Instructions    Yoo Catheter: The catheter typically stays in for 3 to 7 days depending on the size of your prostate gland. Once the catheter is removed after Rezum procedure, most men can void. There are a subset of men who may not be able to void if the bladder is not ready to go yet and may need the catheter replaced. When the catheter first comes out it is very common to feel like you have a urinary tract infection with burning with urination, increased urinary frequency and urinary urgency. These symptoms typically improve over a 2 to 3 week period of time. If these symptoms persist then you should come in for a urine check to make sure you do not have an infection.      Fluids: You should drink plenty of fluids during the day to promote increased production of urine.  Avoid bladder irritants including caffeine, alcohol, smoking, spicy foods, acidic foods, tomato-based products, citrus, artificial sweeteners, chocolate, coffee or tea.    Activity: You should avoid strenuous activity during the first two weeks, which includes heavy lifting, bike riding, treadmill activity, or riding a lawnmower. Avoid sexual activity for two weeks. You may resume your normal daily activities. You may resume driving a car.    Urinary symptoms:  During the first several months, the primary improvement to note is increased urinary flow. With the Rezum procedure, the procedure destroys the prostate tissue with steam and can take some time for the body to reabsorb this prostate tissue. The bladder should now be able to empty with better flow, which you will notice as a more powerful urinary stream. It is important to recognize that it may take a much longer timeframe for the frequency of urination, particularly frequency of urination at night, to reduce.    Symptoms will get worse before they get better. Generally, at two weeks symptoms begin to stabilize. At four weeks, patients  are back where they were before the procedure, and at six weeks patients start to get off medication.    The bladder has been working against obstruction for years. As the bladder has worked against obstruction, it has become thicker and less elastic so that its storage capacity is diminished. After the Rezum procedure, the bladder is no longer working as hard against obstruction. Some of the thickening of the bladder muscle should decreases and the bladder should regain its elasticity. When that occurs, there will be less urgency of urination, but it may take time to notice this improvement. It is important to recognize that it took a long time for the bladder to get to where we are now and so it may take time for the bladder to reverse those changes.    Blood in the urine: You may note some blood in the urine and semen after this procedure. This is normal, and may be intermittent and last for several weeks.  Drink plenty of water.     Medications: Take medications  as prescribed.      Follow up appointment:  The office will call to arrange your follow up for garza catheter removal.     Call the office  or go to nearest Emergency Room if:   -Fever of 101 degrees F or above, chills.    -Inability to urinate.  -Severe Pain that is not controlled with medications.     Mukul Gary MD
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
## 2024-08-28 NOTE — OP NOTE
[facility]  Operative Note     Date: 08/28/2024     Pre-Op Diagnosis: BPH with obstructive urinary symptoms    Patient Active Problem List   Diagnosis    Hyperlipidemia    Hypertension    Rotator cuff arthropathy of left shoulder    Left shoulder pain    ED (erectile dysfunction) of organic origin        Post-Op Diagnosis: same     Procedure(s) Performed:   1. Transurethral destruction of prostate; radiofrequency thermotherapy      Specimen(s): none     Staff Surgeon: Maxim Harris MD     Assistant Surgeon: Mukul Gary MD     Anesthesia:  General endotracheal anesthesia     Indications: Lawrence Devi is a 49 y.o. male with BPH with weak urine flow, incomplete bladder emptying due to bladder outlet obstruction, presenting for surgical intervention.         Findings:    1. Significant lateral lobe hypertrophy, 6 treatments delivered to each lobe in systematic fashion, with care to avoid the bladder neck and verumontanum      Estimated Blood Loss: minimal     Drains:   1.  18 Fr coude garza catheter with 10cc of sterile water in balloon      Procedure in detail: After informed consent was obtained and all questions were answered, the patient was brought to the operating suite and placed in the lithotomy position. General anesthesia was administered. SCDs were applied and working prior to induction of anesthesia. That patient was then prepped and draped in the usual sterile fashion. Time out was performed and preoperative antibiotics were confirmed.       We began the case by inserting the Rezum scope into the bladder. No urethral strictures were seen and scope entered easily.     Next, we examined the prostate and found significant lateral lobe hypertrophy. RF was then used to create thermal energy to selectively ablate prostate tissue 1 cm from the bladder neck to the proximal end of the veru spaced 1 cm apart.      12 total treatments were given. Specifically 6 treatments were given in each  prostate lateral lobe to 3 and 9 o'clock positions.     At the completion of the procedure the device was removed. There was a careful check that there were no clots in the bladder or injury to the bladder, prostate or urethra.      18 fr coude garza catheter was placed with 10 mL of sterile water in the balloon     The patient tolerated the procedure well and was transferred to the PACU in stable condition.       Disposition:  The patient will be discharged home with 10 days of antibiotic therapy, 1 week of pyridium, 1 week of ditropan, 2 weeks of NSAIDS, and pain medications. He will follow up with Dr. Harris in clinic in 7 days to have his garza catheter removed.

## 2024-08-28 NOTE — PLAN OF CARE
Patient is stable and ready for discharge. Instructions given to patient. Son will  prescriptions from pharmacy. Questions answered. Patient tolerating po liquids with no difficulty. Patient has no pain or states it is a tolerable level for them. Anesthesia consent and surgical consent in chart.

## 2024-08-28 NOTE — INTERVAL H&P NOTE
The patient has been examined and the H&P has been reviewed:    I concur with the findings and no changes have occurred since H&P was written.    Anesthesia/Surgery risks, benefits and alternative options discussed and understood by patient/family.    UA today negative for leukocytes and nitrites. Patient denies any dysuria, fevers, or chills. Consent obtained. Will proceed to OR.         There are no hospital problems to display for this patient.

## 2024-08-28 NOTE — BRIEF OP NOTE
[facility] - Surgery ([facility])  Brief Operative Note    Surgery Date: 8/28/2024     Surgeons and Role:     * Maxim Harris MD - Primary     * Mukul Gary MD - Resident - Assisting        Pre-op Diagnosis:  BPH with urinary obstruction [N40.1, N13.8]    Post-op Diagnosis:  Post-Op Diagnosis Codes:     * BPH with urinary obstruction [N40.1, N13.8]    Procedure(s) (LRB):  DESTRUCTION, PROSTATE, TRANSURETHRAL (N/A)    Anesthesia: Monitor Anesthesia Care    Operative Findings: Procedures as stated above successfully performed. No complications.       Estimated Blood Loss: min         Specimens:   Specimen (24h ago, onward)      None              Discharge Note    OUTCOME: Patient tolerated treatment/procedure well without complication and is now ready for discharge.    DISPOSITION: Home or Self Care    FINAL DIAGNOSIS:  same as above    FOLLOWUP: In clinic    DISCHARGE INSTRUCTIONS:  patient can be discharged home

## 2024-09-03 NOTE — ANESTHESIA POSTPROCEDURE EVALUATION
Anesthesia Post Evaluation    Patient: Lawrence Devi    Procedure(s) Performed: Procedure(s) (LRB):  DESTRUCTION, PROSTATE, TRANSURETHRAL (N/A)    Final Anesthesia Type: general      Patient location during evaluation: PACU  Patient participation: Yes- Able to Participate  Level of consciousness: awake and alert and oriented  Post-procedure vital signs: reviewed and stable  Pain management: adequate  Airway patency: patent  YOSELIN mitigation strategies: Intraoperative administration of CPAP, nasopharyngeal airway, or oral appliance during sedation, Multimodal analgesia, Extubation while patient is awake, Verification of full reversal of neuromuscular block and Extubation and recovery carried out in lateral, semiupright, or other nonsupine position  PONV status at discharge: No PONV  Anesthetic complications: no      Cardiovascular status: hemodynamically stable  Respiratory status: unassisted  Hydration status: euvolemic  Follow-up not needed.              Vitals Value Taken Time   /90 08/28/24 1300   Temp 36.3 °C (97.3 °F) 08/28/24 1110   Pulse 65 08/28/24 1300   Resp 60 08/28/24 1300   SpO2 100 % 08/28/24 1300   Vitals shown include unfiled device data.      No case tracking events are documented in the log.      Pain/Dominga Score: No data recorded

## 2024-09-04 ENCOUNTER — TELEPHONE (OUTPATIENT)
Dept: UROLOGY | Facility: CLINIC | Age: 49
End: 2024-09-04
Payer: COMMERCIAL

## 2024-09-04 NOTE — TELEPHONE ENCOUNTER
----- Message from Ada Petersen sent at 9/4/2024  8:20 AM CDT -----  Type:  Same Day Appointment Request    Caller is requesting a same day appointment.  Caller declined first available appointment listed below.    Name of Caller:pt  When is the first available appointment? tomorrow  Best Call Back Number: [phone]  Additional Information: pt states he is in too much pain and needs catheter taken out today. He would like to be seen today to have it removed instead of tomorrow.

## 2024-09-04 NOTE — TELEPHONE ENCOUNTER
Called pt in regards to message in pt portal regarding catheter. Pt states he needs the catheter taken out today, he's in so much pain and discomfort. I informed the pt that Dr Harris is not in clinic today, he is in the OR and the earliest the catheter can be taken out is tomorrow. Pt voiced understanding and said he will wait until tomorrow 9/5/24. Pt confirmed appt.

## 2024-09-05 ENCOUNTER — OFFICE VISIT (OUTPATIENT)
Dept: UROLOGY | Facility: CLINIC | Age: 49
End: 2024-09-05
Payer: COMMERCIAL

## 2024-09-05 DIAGNOSIS — N13.8 BPH WITH URINARY OBSTRUCTION: Primary | ICD-10-CM

## 2024-09-05 DIAGNOSIS — Z90.79 S/P TURP (TRANSURETHRAL RESECTION OF PROSTATE): ICD-10-CM

## 2024-09-05 DIAGNOSIS — N40.1 BPH WITH URINARY OBSTRUCTION: Primary | ICD-10-CM

## 2024-09-05 PROCEDURE — 99499 UNLISTED E&M SERVICE: CPT | Mod: S$GLB,,, | Performed by: UROLOGY
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
## 2024-09-13 RX ORDER — TAMSULOSIN HYDROCHLORIDE 0.4 MG/1
1 CAPSULE ORAL NIGHTLY
Qty: 30 CAPSULE | Refills: 3 | Status: SHIPPED | OUTPATIENT
Start: 2024-09-13 | End: 2025-09-13

## 2024-09-13 NOTE — TELEPHONE ENCOUNTER
----- Message from Jennifer Salguero sent at 9/13/2024  9:40 AM CDT -----  Regarding: Advice  Contact: [phone]  Patient is calling to speak with a nurse he just have a few question he would like to ask. Please contact pt

## 2024-10-18 ENCOUNTER — PATIENT MESSAGE (OUTPATIENT)
Dept: ADMINISTRATIVE | Facility: OTHER | Age: 49
End: 2024-10-18
Payer: COMMERCIAL

## 2024-10-22 ENCOUNTER — PATIENT MESSAGE (OUTPATIENT)
Dept: UROLOGY | Facility: CLINIC | Age: 49
End: 2024-10-22
Payer: COMMERCIAL

## 2024-10-24 ENCOUNTER — PATIENT MESSAGE (OUTPATIENT)
Dept: UROLOGY | Facility: CLINIC | Age: 49
End: 2024-10-24
Payer: COMMERCIAL

## 2024-11-22 ENCOUNTER — OFFICE VISIT (OUTPATIENT)
Dept: UROLOGY | Facility: CLINIC | Age: 49
End: 2024-11-22
Payer: COMMERCIAL

## 2024-11-22 VITALS
WEIGHT: 218.94 LBS | HEART RATE: 72 BPM | HEIGHT: 73 IN | SYSTOLIC BLOOD PRESSURE: 124 MMHG | DIASTOLIC BLOOD PRESSURE: 74 MMHG | BODY MASS INDEX: 29.02 KG/M2

## 2024-11-22 DIAGNOSIS — N32.81 OAB (OVERACTIVE BLADDER): Primary | ICD-10-CM

## 2024-11-22 DIAGNOSIS — N41.1 PROSTATITIS, CHRONIC: ICD-10-CM

## 2024-11-22 PROCEDURE — 99999 PR PBB SHADOW E&M-EST. PATIENT-LVL IV: CPT | Mod: PBBFAC,,, | Performed by: UROLOGY

## 2024-11-22 PROCEDURE — 87086 URINE CULTURE/COLONY COUNT: CPT | Performed by: UROLOGY

## 2024-11-22 RX ORDER — SOLIFENACIN SUCCINATE 10 MG/1
10 TABLET, FILM COATED ORAL DAILY
Qty: 30 TABLET | Refills: 11 | Status: SHIPPED | OUTPATIENT
Start: 2024-11-22 | End: 2025-11-22

## 2024-11-22 RX ORDER — LIDOCAINE HYDROCHLORIDE 20 MG/ML
JELLY TOPICAL ONCE
OUTPATIENT
Start: 2024-11-22 | End: 2024-11-22

## 2024-11-22 RX ORDER — CIPROFLOXACIN 500 MG/1
500 TABLET ORAL 2 TIMES DAILY
Qty: 20 TABLET | Refills: 0 | Status: SHIPPED | OUTPATIENT
Start: 2024-11-22 | End: 2024-12-02

## 2024-11-22 RX ORDER — DOXYCYCLINE HYCLATE 100 MG
100 TABLET ORAL ONCE
OUTPATIENT
Start: 2024-11-22 | End: 2024-11-22
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
## 2024-11-23 LAB — BACTERIA UR CULT: NORMAL

## 2025-02-12 ENCOUNTER — TELEPHONE (OUTPATIENT)
Dept: UROLOGY | Facility: CLINIC | Age: 50
End: 2025-02-12
Payer: COMMERCIAL
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
## 2025-02-12 NOTE — TELEPHONE ENCOUNTER
----- Message from Kiana sent at 2/12/2025 11:52 AM CST -----  Type: General Call Back     Name of Caller:ARMOND MAK [[id number]]    Reason for call back?:unknown     Best Call Back Number:[phone]    Additional Information: patient states he needs to speak with someone in the providers office directly. Patient states he has tried multiple times to get in touch with the providers office and not been contacted. Patient states he is very frustrated and would like to speak with someone before filing a complaint. Patient would not share what this is in regards to and states he would like a call back today 02/12.

## 2025-02-13 ENCOUNTER — TELEPHONE (OUTPATIENT)
Dept: UROLOGY | Facility: CLINIC | Age: 50
End: 2025-02-13
Payer: COMMERCIAL

## 2025-02-13 DIAGNOSIS — R97.20 ELEVATED PSA: Primary | ICD-10-CM

## 2025-02-13 RX ORDER — LIDOCAINE HYDROCHLORIDE 20 MG/ML
JELLY TOPICAL
Status: SHIPPED | OUTPATIENT
Start: 2025-02-20

## 2025-02-13 RX ORDER — ENEMA 19; 7 G/133ML; G/133ML
1 ENEMA RECTAL ONCE
Qty: 2 ENEMA | Refills: 0 | Status: SHIPPED | OUTPATIENT
Start: 2025-02-13 | End: 2025-02-13

## 2025-02-13 RX ORDER — CEFTRIAXONE 1 G/1
1 INJECTION, POWDER, FOR SOLUTION INTRAMUSCULAR; INTRAVENOUS
Status: SHIPPED | OUTPATIENT
Start: 2025-02-13 | End: 2025-02-13

## 2025-02-13 RX ORDER — CIPROFLOXACIN 500 MG/1
500 TABLET ORAL 2 TIMES DAILY
Qty: 6 TABLET | Refills: 0 | Status: SHIPPED | OUTPATIENT
Start: 2025-02-13 | End: 2025-02-16

## 2025-02-13 RX ORDER — LIDOCAINE HYDROCHLORIDE 10 MG/ML
20 INJECTION, SOLUTION INFILTRATION; PERINEURAL
Status: SHIPPED | OUTPATIENT
Start: 2025-02-20

## 2025-02-13 NOTE — PROGRESS NOTES
Lab Results   Component Value Date    PSA 0.83 05/03/2013    PSADIAG 2.1 07/29/2024      Elevated PSA  -     sodium phosphates (FLEET ENEMA) 19-7 gram/118 mL Enem; Place 1 enema rectally once. for 1 dose  Dispense: 2 enema; Refill: 0  -     ciprofloxacin HCl (CIPRO) 500 MG tablet; Take 1 tablet (500 mg total) by mouth 2 (two) times daily. for 6 doses  Dispense: 6 tablet; Refill: 0  -     Transrectal Ultrasound w/ Biopsy; Future  -     Specimen to Pathology Urology; Future; Expected date: 02/13/2025    Other orders  -     LIDOcaine HCl 2% urojet  -     cefTRIAXone injection 1 g  -     LIDOcaine HCL 10 mg/ml (1%) injection 20 mL
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
## 2025-02-13 NOTE — TELEPHONE ENCOUNTER
7/29/24  A new pt referred by his PCP, Ernie Whalen Jr., MD   C/o weak urine flow, intermittent flow, incomplete bladder emptying.  Has tried flomax over 3 months and did notice some improvement. However, it did not adequately treat his lower urinary tract symptoms.  Will plan cysto.  He can continue flomax.  Start daily cialis for BPH as well as ED.    8/20/24  Cysto showed trilobar obstruction. 2+ trabeculation and tic in the bladder  Suspect BPH with obstruction and OAB  Discussed alternative therapy since he failed to respond to flomax and daily cialis.  Rezum therapy planned.    8/28/24  Rezum Therapy  12 treatment  1. Significant lateral lobe hypertrophy, 6 treatments delivered to each lobe in systematic fashion, with care to avoid the bladder neck and verumontanum     9/5/24  Successful voiding trial    11/22/24  C/o frequency and urgency  S/p Rezum Therapy on 8/28/24.  Has been urinating better.  However, he still has a problem with frequency urgency.  He can only void less than 3 oz even with a full bladder in morning.  Will start him on vesicare 10 mg daily and evaluate him with SUDS cysto.  May require botox injection or sacral neuromodulation.  continue daily cialis 5 mg for BPH as well as ED.     Viagra 50 mg did work well but he was really symptomatic with nasal congestion and HA.  He needs to get a better control of his HTN.    2/13/25  Today I had a lengthy phone conversation with him and his girl friend regarding his care noted as above, and additional problem of ejaculatory problem which I did not address when he was seen by me.    Suspect retrograde ejaculation following Rezum Therapy.  He is not on flomax and still voiding well.  Responded well to Vesicare and he does not need Urodynamic study evaluation any more.  No additional evaluation or treatment needed.  I accidentally placed an order for TRUS bx of prostate, which can be deleted.  Over 30 mins discuss carried out.

## 2025-03-09 NOTE — DISCHARGE SUMMARY
[facility]  Colorectal Surgery  Discharge Summary      Patient Name: Lawrence Devi  MRN: [id number]  Admission Date: 10/8/2018  Hospital Length of Stay: 0 days  Discharge Date and Time: 10/9/2018 12:57 PM  Attending Physician: No att. providers found   Discharging Provider: Alma Almanza NP  Primary Care Provider: Ernie Whalen Jr, MD (Inactive)     HPI:  43 year old black male presents to ER with complaints of painless rectal bleeding, had colonoscopy 10/3/18 by Dr. Ibarra for lower back pain.  He began to see bright red blood with clots on Friday, continued with increase on Saturday with slight decrease on Sunday, denies abd or rectal pain, no dizzness, says he feels fine, denies any use of NSAIDS or ASA.    Colonoscopy 10/3/18:   One 8 mm polyp at the ileocecal valve, removed                         with a hot snare. Resected and retrieved.                        - One 15 mm polyp at the hepatic flexure, removed                         with a hot snare. Resected and retrieved.                        - One 6 mm polyp in the rectum, removed with a cold                         snare. Resected and retrieved.                        - The examination was otherwise normal.  Path not available at this time    * No surgery found *     Hospital Course:  Pt was admited as obs for post polypectomy bleed, keep npo and serial H&H.  HD 2 he had not further bowel movements, H&H stable, jim diet and was discharged home to fu with Dr. Ibarra 2 weeks        Significant Diagnostic Studies: Labs:   BMP:   Recent Labs   Lab  10/08/18   1105   GLU  103   NA  139   K  3.5   CL  104   CO2  28   BUN  13   CREATININE  0.9   CALCIUM  9.2    and CBC   Recent Labs   Lab  10/08/18   1105   10/09/18   0019  10/09/18   0557  10/09/18   1143   WBC  8.55   --    --    --    --    HGB  12.3*   < >  10.2*  10.9*  11.7*   HCT  36.8*   < >  32.1*  32.6*  35.0*   PLT  318   --    --    --    --     < > = values in this interval not  displayed.       Pending Diagnostic Studies:     None        Final Active Diagnoses:      Problems Resolved During this Admission:    Diagnosis Date Noted Date Resolved POA    PRINCIPAL PROBLEM:  Rectal bleeding [K62.5] 10/08/2018 10/09/2018 Unknown      Discharged Condition: good    Disposition: Home or Self Care    Follow Up:  Follow-up Information     Franck Ibarra MD In 2 weeks.    Specialty:  Colon and Rectal Surgery  Contact information:  [address]  [facility] [id number]  [phone]                 Patient Instructions:      Diet Adult Regular   Order Comments: Low fiber diet, no fresh fruit or fresh vegetables. No nuts, popcorn, grapes or raisins for approx 6 weeks     Lifting restrictions   Order Comments: No lifting anything greater than 5 pounds     Call MD for:  persistent nausea and vomiting or diarrhea     Call MD for:  severe uncontrolled pain     Call MD for:  redness, tenderness, or signs of infection (pain, swelling, redness, odor or green/yellow discharge around incision site)     Call MD for:  difficulty breathing or increased cough     Call MD for:  severe persistent headache     Call MD for:  worsening rash     Call MD for:  persistent dizziness, light-headedness, or visual disturbances     Call MD for:  increased confusion or weakness     Call MD for:   Order Comments: Call for temp above 101     Medications:  Reconciled Home Medications:      Medication List      You have not been prescribed any medications.         Alma Almanza NP  Colorectal Surgery  [facility]     10-Mar-2025 01:14

## 2025-08-26 ENCOUNTER — PATIENT MESSAGE (OUTPATIENT)
Dept: UROLOGY | Facility: CLINIC | Age: 50
End: 2025-08-26
Payer: COMMERCIAL

## 2025-08-26 RX ORDER — TADALAFIL 20 MG/1
20 TABLET ORAL
Qty: 30 TABLET | Refills: 3 | Status: SHIPPED | OUTPATIENT
Start: 2025-08-26

## 2025-08-26 RX ORDER — TADALAFIL 5 MG/1
5 TABLET ORAL DAILY PRN
Qty: 30 TABLET | Refills: 3 | Status: SHIPPED | OUTPATIENT
Start: 2025-08-26

## (undated) DEVICE — SYS DELIVERY REZUM

## (undated) DEVICE — PACK CYSTOSCOPY III SIRUS

## (undated) DEVICE — Device

## (undated) DEVICE — BAG URINARY DRAINAGE 2000ML

## (undated) DEVICE — TRAY CYSTO BASIN OMC

## (undated) DEVICE — SYR 10CC LUER LOCK

## (undated) DEVICE — UNDERGLOVES BIOGEL PI SIZE 7.5

## (undated) DEVICE — SOL NACL IRR 3000ML

## (undated) DEVICE — HOLDER CATH IAB ADH STATLOCK